# Patient Record
Sex: FEMALE | Race: BLACK OR AFRICAN AMERICAN | Employment: FULL TIME | URBAN - METROPOLITAN AREA
[De-identification: names, ages, dates, MRNs, and addresses within clinical notes are randomized per-mention and may not be internally consistent; named-entity substitution may affect disease eponyms.]

---

## 2017-02-01 ENCOUNTER — HOSPITAL ENCOUNTER (EMERGENCY)
Age: 27
Discharge: HOME | End: 2017-02-01
Attending: EMERGENCY MEDICINE
Payer: SELF-PAY

## 2017-02-01 ENCOUNTER — APPOINTMENT (OUTPATIENT)
Dept: CT IMAGING | Age: 27
End: 2017-02-01
Attending: EMERGENCY MEDICINE
Payer: SELF-PAY

## 2017-02-01 VITALS
SYSTOLIC BLOOD PRESSURE: 114 MMHG | WEIGHT: 145 LBS | OXYGEN SATURATION: 99 % | HEART RATE: 75 BPM | BODY MASS INDEX: 25.69 KG/M2 | HEIGHT: 63 IN | RESPIRATION RATE: 17 BRPM | TEMPERATURE: 98.2 F | DIASTOLIC BLOOD PRESSURE: 69 MMHG

## 2017-02-01 DIAGNOSIS — R10.31 ABDOMINAL PAIN, RIGHT LOWER QUADRANT: Primary | ICD-10-CM

## 2017-02-01 LAB
ALBUMIN SERPL BCP-MCNC: 3.8 G/DL (ref 3.5–5)
ALBUMIN/GLOB SERPL: 0.9 {RATIO} (ref 1.2–3.5)
ALP SERPL-CCNC: 59 U/L (ref 50–136)
ALT SERPL-CCNC: 21 U/L (ref 12–65)
ANION GAP BLD CALC-SCNC: 6 MMOL/L (ref 7–16)
AST SERPL W P-5'-P-CCNC: 17 U/L (ref 15–37)
BASOPHILS # BLD AUTO: 0 K/UL (ref 0–0.2)
BASOPHILS # BLD: 0 % (ref 0–2)
BILIRUB SERPL-MCNC: 0.5 MG/DL (ref 0.2–1.1)
BUN SERPL-MCNC: 15 MG/DL (ref 6–23)
CALCIUM SERPL-MCNC: 8.6 MG/DL (ref 8.3–10.4)
CHLORIDE SERPL-SCNC: 110 MMOL/L (ref 98–107)
CO2 SERPL-SCNC: 24 MMOL/L (ref 21–32)
CREAT SERPL-MCNC: 0.8 MG/DL (ref 0.6–1)
DIFFERENTIAL METHOD BLD: ABNORMAL
EOSINOPHIL # BLD: 0.1 K/UL (ref 0–0.8)
EOSINOPHIL NFR BLD: 2 % (ref 0.5–7.8)
ERYTHROCYTE [DISTWIDTH] IN BLOOD BY AUTOMATED COUNT: 14.2 % (ref 11.9–14.6)
GLOBULIN SER CALC-MCNC: 4.1 G/DL (ref 2.3–3.5)
GLUCOSE SERPL-MCNC: 91 MG/DL (ref 65–100)
HCG UR QL: NEGATIVE
HCT VFR BLD AUTO: 41.8 % (ref 35.8–46.3)
HGB BLD-MCNC: 13.6 G/DL (ref 11.7–15.4)
IMM GRANULOCYTES # BLD: 0 K/UL (ref 0–0.5)
IMM GRANULOCYTES NFR BLD AUTO: 0 % (ref 0–5)
LIPASE SERPL-CCNC: 129 U/L (ref 73–393)
LYMPHOCYTES # BLD AUTO: 34 % (ref 13–44)
LYMPHOCYTES # BLD: 2.5 K/UL (ref 0.5–4.6)
MCH RBC QN AUTO: 28.3 PG (ref 26.1–32.9)
MCHC RBC AUTO-ENTMCNC: 32.5 G/DL (ref 31.4–35)
MCV RBC AUTO: 87.1 FL (ref 79.6–97.8)
MONOCYTES # BLD: 0.6 K/UL (ref 0.1–1.3)
MONOCYTES NFR BLD AUTO: 8 % (ref 4–12)
NEUTS SEG # BLD: 4.2 K/UL (ref 1.7–8.2)
NEUTS SEG NFR BLD AUTO: 56 % (ref 43–78)
PLATELET # BLD AUTO: 320 K/UL (ref 150–450)
PMV BLD AUTO: 9.6 FL (ref 10.8–14.1)
POTASSIUM SERPL-SCNC: 4.1 MMOL/L (ref 3.5–5.1)
PROT SERPL-MCNC: 7.9 G/DL (ref 6.3–8.2)
RBC # BLD AUTO: 4.8 M/UL (ref 4.05–5.25)
SODIUM SERPL-SCNC: 140 MMOL/L (ref 136–145)
WBC # BLD AUTO: 7.5 K/UL (ref 4.3–11.1)

## 2017-02-01 PROCEDURE — 83690 ASSAY OF LIPASE: CPT | Performed by: EMERGENCY MEDICINE

## 2017-02-01 PROCEDURE — 99285 EMERGENCY DEPT VISIT HI MDM: CPT | Performed by: EMERGENCY MEDICINE

## 2017-02-01 PROCEDURE — 74011000258 HC RX REV CODE- 258: Performed by: EMERGENCY MEDICINE

## 2017-02-01 PROCEDURE — 94762 N-INVAS EAR/PLS OXIMTRY CONT: CPT | Performed by: EMERGENCY MEDICINE

## 2017-02-01 PROCEDURE — 74177 CT ABD & PELVIS W/CONTRAST: CPT

## 2017-02-01 PROCEDURE — 96374 THER/PROPH/DIAG INJ IV PUSH: CPT | Performed by: EMERGENCY MEDICINE

## 2017-02-01 PROCEDURE — 74011250636 HC RX REV CODE- 250/636: Performed by: EMERGENCY MEDICINE

## 2017-02-01 PROCEDURE — 81003 URINALYSIS AUTO W/O SCOPE: CPT | Performed by: EMERGENCY MEDICINE

## 2017-02-01 PROCEDURE — 80053 COMPREHEN METABOLIC PANEL: CPT | Performed by: EMERGENCY MEDICINE

## 2017-02-01 PROCEDURE — 85025 COMPLETE CBC W/AUTO DIFF WBC: CPT | Performed by: EMERGENCY MEDICINE

## 2017-02-01 PROCEDURE — 96375 TX/PRO/DX INJ NEW DRUG ADDON: CPT | Performed by: EMERGENCY MEDICINE

## 2017-02-01 PROCEDURE — 81025 URINE PREGNANCY TEST: CPT

## 2017-02-01 PROCEDURE — 74011636320 HC RX REV CODE- 636/320: Performed by: EMERGENCY MEDICINE

## 2017-02-01 RX ORDER — ONDANSETRON 2 MG/ML
4 INJECTION INTRAMUSCULAR; INTRAVENOUS
Status: COMPLETED | OUTPATIENT
Start: 2017-02-01 | End: 2017-02-01

## 2017-02-01 RX ORDER — HYDROMORPHONE HYDROCHLORIDE 1 MG/ML
0.5 INJECTION, SOLUTION INTRAMUSCULAR; INTRAVENOUS; SUBCUTANEOUS
Status: COMPLETED | OUTPATIENT
Start: 2017-02-01 | End: 2017-02-01

## 2017-02-01 RX ORDER — SODIUM CHLORIDE 0.9 % (FLUSH) 0.9 %
10 SYRINGE (ML) INJECTION
Status: COMPLETED | OUTPATIENT
Start: 2017-02-01 | End: 2017-02-01

## 2017-02-01 RX ADMIN — IOPAMIDOL 100 ML: 755 INJECTION, SOLUTION INTRAVENOUS at 08:28

## 2017-02-01 RX ADMIN — ONDANSETRON 4 MG: 2 INJECTION INTRAMUSCULAR; INTRAVENOUS at 08:03

## 2017-02-01 RX ADMIN — HYDROMORPHONE HYDROCHLORIDE 0.5 MG: 1 INJECTION, SOLUTION INTRAMUSCULAR; INTRAVENOUS; SUBCUTANEOUS at 08:03

## 2017-02-01 RX ADMIN — Medication 10 ML: at 08:28

## 2017-02-01 RX ADMIN — SODIUM CHLORIDE 100 ML: 900 INJECTION, SOLUTION INTRAVENOUS at 08:28

## 2017-02-01 NOTE — LETTER
[facility] EMERGENCY DEPT One [address] [id number] 
[phone] Work/School Note Date: 2/1/2017 To Whom It May concern: 
 
Neil Aguilera was seen and treated today in the emergency room by the following provider(s): 
Attending Provider: Trace Rodríguez MD.   
 
Neil Aguilera may return to work on 1/3/17.  
 
Sincerely, 
 
 
 
 
Christiane Wolfe R.N.

## 2017-02-01 NOTE — ED PROVIDER NOTES
HPI Comments: 31 yo female with abdominal pain. States pain gradually worsening for the past 2 days and states at work last night she developed nausea and vomiting and has been unable to keep food down since that time with pain in her abdomen. States most painful in the \"bottom\" of her abdomen. Denies any chest pain, no SOB, no vaginal bleeding or discharge, no further complaints. Patient is a 32 y.o. female presenting with abdominal pain. The history is provided by the patient. No  was used. Abdominal Pain    Associated symptoms include nausea and vomiting. Pertinent negatives include no fever, no diarrhea, no dysuria, no headaches, no chest pain and no back pain. Past Medical History:   Diagnosis Date    Asthma        No past surgical history on file. No family history on file. Social History     Social History    Marital status: SINGLE     Spouse name: N/A    Number of children: N/A    Years of education: N/A     Occupational History    Not on file. Social History Main Topics    Smoking status: Current Every Day Smoker    Smokeless tobacco: Not on file    Alcohol use Yes    Drug use: Yes     Special: Marijuana    Sexual activity: Not on file     Other Topics Concern    Not on file     Social History Narrative         ALLERGIES: Review of patient's allergies indicates no known allergies. Review of Systems   Constitutional: Negative for chills and fever. HENT: Negative for rhinorrhea and sore throat. Eyes: Negative for visual disturbance. Respiratory: Negative for cough and shortness of breath. Cardiovascular: Negative for chest pain and leg swelling. Gastrointestinal: Positive for abdominal pain, nausea and vomiting. Negative for diarrhea. Genitourinary: Negative for dysuria. Musculoskeletal: Negative for back pain and neck pain. Skin: Negative for rash. Neurological: Negative for weakness and headaches.    Psychiatric/Behavioral: The patient is not nervous/anxious. Vitals:    02/01/17 0711   BP: 126/63   Pulse: 98   Resp: 18   Temp: 98.3 °F (36.8 °C)   SpO2: 99%   Weight: 65.8 kg (145 lb)   Height: 5' 3\" (1.6 m)            Physical Exam   Constitutional: She is oriented to person, place, and time. She appears well-developed and well-nourished. HENT:   Head: Normocephalic. Right Ear: External ear normal.   Left Ear: External ear normal.   Eyes: Conjunctivae and EOM are normal. Pupils are equal, round, and reactive to light. Neck: Normal range of motion. Neck supple. No tracheal deviation present. Cardiovascular: Normal rate, regular rhythm, normal heart sounds and intact distal pulses. No murmur heard. Pulmonary/Chest: Effort normal and breath sounds normal. No respiratory distress. Abdominal: Soft. She exhibits no distension. There is tenderness (to palpation in the RLQ without rebound. Non-tender in LLQ, RUQ/LUQ). There is no rebound. Musculoskeletal: Normal range of motion. Neurological: She is alert and oriented to person, place, and time. No cranial nerve deficit. Skin: No rash noted. Nursing note and vitals reviewed.        MDM  Number of Diagnoses or Management Options  Abdominal pain, right lower quadrant: new and requires workup     Amount and/or Complexity of Data Reviewed  Clinical lab tests: ordered and reviewed  Tests in the radiology section of CPT®: ordered and reviewed  Tests in the medicine section of CPT®: ordered and reviewed  Review and summarize past medical records: yes    Risk of Complications, Morbidity, and/or Mortality  Presenting problems: high  Diagnostic procedures: high  Management options: high    Patient Progress  Patient progress: stable    ED Course       Procedures    Recent Results (from the past 12 hour(s))   CBC WITH AUTOMATED DIFF    Collection Time: 02/01/17  7:10 AM   Result Value Ref Range    WBC 7.5 4.3 - 11.1 K/uL    RBC 4.80 4.05 - 5.25 M/uL    HGB 13.6 11.7 - 15.4 g/dL HCT 41.8 35.8 - 46.3 %    MCV 87.1 79.6 - 97.8 FL    MCH 28.3 26.1 - 32.9 PG    MCHC 32.5 31.4 - 35.0 g/dL    RDW 14.2 11.9 - 14.6 %    PLATELET 548 586 - 554 K/uL    MPV 9.6 (L) 10.8 - 14.1 FL    DF AUTOMATED      NEUTROPHILS 56 43 - 78 %    LYMPHOCYTES 34 13 - 44 %    MONOCYTES 8 4.0 - 12.0 %    EOSINOPHILS 2 0.5 - 7.8 %    BASOPHILS 0 0.0 - 2.0 %    IMMATURE GRANULOCYTES 0.0 0.0 - 5.0 %    ABS. NEUTROPHILS 4.2 1.7 - 8.2 K/UL    ABS. LYMPHOCYTES 2.5 0.5 - 4.6 K/UL    ABS. MONOCYTES 0.6 0.1 - 1.3 K/UL    ABS. EOSINOPHILS 0.1 0.0 - 0.8 K/UL    ABS. BASOPHILS 0.0 0.0 - 0.2 K/UL    ABS. IMM. GRANS. 0.0 0.0 - 0.5 K/UL     Recent Results (from the past 12 hour(s))   CBC WITH AUTOMATED DIFF    Collection Time: 02/01/17  7:10 AM   Result Value Ref Range    WBC 7.5 4.3 - 11.1 K/uL    RBC 4.80 4.05 - 5.25 M/uL    HGB 13.6 11.7 - 15.4 g/dL    HCT 41.8 35.8 - 46.3 %    MCV 87.1 79.6 - 97.8 FL    MCH 28.3 26.1 - 32.9 PG    MCHC 32.5 31.4 - 35.0 g/dL    RDW 14.2 11.9 - 14.6 %    PLATELET 325 964 - 415 K/uL    MPV 9.6 (L) 10.8 - 14.1 FL    DF AUTOMATED      NEUTROPHILS 56 43 - 78 %    LYMPHOCYTES 34 13 - 44 %    MONOCYTES 8 4.0 - 12.0 %    EOSINOPHILS 2 0.5 - 7.8 %    BASOPHILS 0 0.0 - 2.0 %    IMMATURE GRANULOCYTES 0.0 0.0 - 5.0 %    ABS. NEUTROPHILS 4.2 1.7 - 8.2 K/UL    ABS. LYMPHOCYTES 2.5 0.5 - 4.6 K/UL    ABS. MONOCYTES 0.6 0.1 - 1.3 K/UL    ABS. EOSINOPHILS 0.1 0.0 - 0.8 K/UL    ABS. BASOPHILS 0.0 0.0 - 0.2 K/UL    ABS. IMM.  GRANS. 0.0 0.0 - 0.5 K/UL   METABOLIC PANEL, COMPREHENSIVE    Collection Time: 02/01/17  7:10 AM   Result Value Ref Range    Sodium 140 136 - 145 mmol/L    Potassium 4.1 3.5 - 5.1 mmol/L    Chloride 110 (H) 98 - 107 mmol/L    CO2 24 21 - 32 mmol/L    Anion gap 6 (L) 7 - 16 mmol/L    Glucose 91 65 - 100 mg/dL    BUN 15 6 - 23 MG/DL    Creatinine 0.80 0.6 - 1.0 MG/DL    GFR est AA >60 >60 ml/min/1.73m2    GFR est non-AA >60 >60 ml/min/1.73m2    Calcium 8.6 8.3 - 10.4 MG/DL    Bilirubin, total 0.5 0.2 - 1.1 MG/DL    ALT 21 12 - 65 U/L    AST 17 15 - 37 U/L    Alk. phosphatase 59 50 - 136 U/L    Protein, total 7.9 6.3 - 8.2 g/dL    Albumin 3.8 3.5 - 5.0 g/dL    Globulin 4.1 (H) 2.3 - 3.5 g/dL    A-G Ratio 0.9 (L) 1.2 - 3.5     HCG URINE, QL. - POC    Collection Time: 02/01/17  7:36 AM   Result Value Ref Range    Pregnancy test,urine (POC) NEGATIVE  NEG       Ct Abd Pelv W Cont    Result Date: 2/1/2017  CT of the Abdomen with Contrast and CT of the Pelvis with Contrast 2/1/2017 Indication: Sharp lower abdomen pain with vomiting onset this morning. Comparison: none Technique:  Multiple contiguous axial images encompassing the abdomen and pelvis are obtained following the administration of approximately 100cc of Isovue 370 nonionic intravenous contrast by power injector. No oral contrast is administered. For this CT scanner at least one of the following techniques is utilized to decrease patient radiation dose: Automatic exposure control, KVP and mA modulation based on patient weight, and iterative reconstruction. IV contrast is given to better delineate vascular structures and increase sensitivity of lesions in the solid organs. CT abdomen-lung bases are unremarkable. In the abdomen there is no free air or free fluid. The unopacified bowel loops are nondistended, no obvious inflammation seen. The liver, gallbladder, biliary tree and pancreas are not remarkable with only an incidental small site of focal fatty change in the left lobe of the liver at a typical position adjacent to the falciform ligament. The stomach, duodenum, spleen, adrenals and kidneys are unremarkable. Aorta and IVC are normal and there is no enlarged adenopathy. CT pelvis-the opacified pelvic small bowel loops and the rectosigmoid colon are nondistended and show nothing acute. A normal appendix is identified. The bladder is relatively nondistended. The uterus and left adnexa are not remarkable.  There is an mildly enlarged right adnexal cyst measuring up to 3.7 cm, otherwise simple appearing though. There is no significant free fluid. There is no lymphadenopathy or acute vascular lesion. IMPRESSION: No acute abnormality seen in the abdomen or pelvis. Note made only of a slightly prominent size right adnexal cyst which otherwise appears simple on this exam. For this premenopausal female this is likely a functional cyst.       31 yo female with abdominal pain:    On repeat assessment patient very well appearing, states no pain at this time, resting comfortably. I discussed her results including the cyst on her right ovary which needs follow up with gynecology. I discussed torsion with patient and feel given her symptoms of gradual pain for days with nausea and vomiting today that this is unlikely however encouraged immediate return if her pain returns and patient in full agreement with plan and follow up.

## 2017-02-01 NOTE — DISCHARGE INSTRUCTIONS
AS WE DISCUSSED, IF YOUR ABDOMINAL PAIN RETURNS, OR IF YOU DEVELOP ANY FEVERS OR CHILLS, ANY NAUSEA OR VOMITING AND CANNOT TOLERATE ORAL INTAKE, ANY CHEST PAIN OR SHORTNESS OF BREATH, ANY VAGINAL BLEEDING OR DISCHARGE OR ANY FURTHER CONCERNS THEN PLEASE RETURN IMMEDIATELY TO THE EMERGENCY DEPARTMENT. OTHERWISE PLEASE FOLLOW UP WITH YOUR PRIMARY CARE PROVIDER IN 1-2 DAYS. Also, please follow up with gynecology as above or return immediately if pain returns as we discussed. Abdominal Pain: Care Instructions  Your Care Instructions    Abdominal pain has many possible causes. Some aren't serious and get better on their own in a few days. Others need more testing and treatment. If your pain continues or gets worse, you need to be rechecked and may need more tests to find out what is wrong. You may need surgery to correct the problem. Don't ignore new symptoms, such as fever, nausea and vomiting, urination problems, pain that gets worse, and dizziness. These may be signs of a more serious problem. Your doctor may have recommended a follow-up visit in the next 8 to 12 hours. If you are not getting better, you may need more tests or treatment. The doctor has checked you carefully, but problems can develop later. If you notice any problems or new symptoms, get medical treatment right away. Follow-up care is a key part of your treatment and safety. Be sure to make and go to all appointments, and call your doctor if you are having problems. It's also a good idea to know your test results and keep a list of the medicines you take. How can you care for yourself at home? · Rest until you feel better. · To prevent dehydration, drink plenty of fluids, enough so that your urine is light yellow or clear like water. Choose water and other caffeine-free clear liquids until you feel better.  If you have kidney, heart, or liver disease and have to limit fluids, talk with your doctor before you increase the amount of fluids you drink. · If your stomach is upset, eat mild foods, such as rice, dry toast or crackers, bananas, and applesauce. Try eating several small meals instead of two or three large ones. · Wait until 48 hours after all symptoms have gone away before you have spicy foods, alcohol, and drinks that contain caffeine. · Do not eat foods that are high in fat. · Avoid anti-inflammatory medicines such as aspirin, ibuprofen (Advil, Motrin), and naproxen (Aleve). These can cause stomach upset. Talk to your doctor if you take daily aspirin for another health problem. When should you call for help? Call 911 anytime you think you may need emergency care. For example, call if:  · You passed out (lost consciousness). · You pass maroon or very bloody stools. · You vomit blood or what looks like coffee grounds. · You have new, severe belly pain. Call your doctor now or seek immediate medical care if:  · Your pain gets worse, especially if it becomes focused in one area of your belly. · You have a new or higher fever. · Your stools are black and look like tar, or they have streaks of blood. · You have unexpected vaginal bleeding. · You have symptoms of a urinary tract infection. These may include:  ¨ Pain when you urinate. ¨ Urinating more often than usual.  ¨ Blood in your urine. · You are dizzy or lightheaded, or you feel like you may faint. Watch closely for changes in your health, and be sure to contact your doctor if:  · You are not getting better after 1 day (24 hours). Where can you learn more? Go to http://chelsey-hali.info/. Enter J596 in the search box to learn more about \"Abdominal Pain: Care Instructions. \"  Current as of: May 27, 2016  Content Version: 11.1  © 8415-3842 [employer]. Care instructions adapted under license by [employer] (which disclaims liability or warranty for this information).  If you have questions about a medical condition or this instruction, always ask your healthcare professional. [place] [address] any warranty or liability for your use of this information. Nausea and Vomiting: Care Instructions  Your Care Instructions    When you are nauseated, you may feel weak and sweaty and notice a lot of saliva in your mouth. Nausea often leads to vomiting. Most of the time you do not need to worry about nausea and vomiting, but they can be signs of other illnesses. Two common causes of nausea and vomiting are stomach flu and food poisoning. Nausea and vomiting from viral stomach flu will usually start to improve within 24 hours. Nausea and vomiting from food poisoning may last from 12 to 48 hours. The doctor has checked you carefully, but problems can develop later. If you notice any problems or new symptoms, get medical treatment right away. Follow-up care is a key part of your treatment and safety. Be sure to make and go to all appointments, and call your doctor if you are having problems. It's also a good idea to know your test results and keep a list of the medicines you take. How can you care for yourself at home? · To prevent dehydration, drink plenty of fluids, enough so that your urine is light yellow or clear like water. Choose water and other caffeine-free clear liquids until you feel better. If you have kidney, heart, or liver disease and have to limit fluids, talk with your doctor before you increase the amount of fluids you drink. · Rest in bed until you feel better. · When you are able to eat, try clear soups, mild foods, and liquids until all symptoms are gone for 12 to 48 hours. Other good choices include dry toast, crackers, cooked cereal, and gelatin dessert, such as Jell-O. When should you call for help? Call 911 anytime you think you may need emergency care. For example, call if:  · You passed out (lost consciousness).   Call your doctor now or seek immediate medical care if:  · You have symptoms of dehydration, such as:  ¨ Dry eyes and a dry mouth. ¨ Passing only a little dark urine. ¨ Feeling thirstier than usual.  · You have new or worsening belly pain. · You have a new or higher fever. · You vomit blood or what looks like coffee grounds. Watch closely for changes in your health, and be sure to contact your doctor if:  · You have ongoing nausea and vomiting. · Your vomiting is getting worse. · Your vomiting lasts longer than 2 days. · You are not getting better as expected. Where can you learn more? Go to http://chelsey-hali.info/. Enter 25 [id number] in the search box to learn more about \"Nausea and Vomiting: Care Instructions. \"  Current as of: May 27, 2016  Content Version: 11.1  © 8785-8883 [employer]. Care instructions adapted under license by [employer] (which disclaims liability or warranty for this information). If you have questions about a medical condition or this instruction, always ask your healthcare professional. [address] any warranty or liability for your use of this information.

## 2017-02-01 NOTE — ED TRIAGE NOTES
Per patient 0130 started vomiting states she had to leave work. Per patient sharp pains in the lower abd.  Denies any urinary sx

## 2018-03-10 ENCOUNTER — APPOINTMENT (OUTPATIENT)
Dept: CT IMAGING | Age: 28
End: 2018-03-10
Attending: NURSE PRACTITIONER
Payer: SELF-PAY

## 2018-03-10 ENCOUNTER — HOSPITAL ENCOUNTER (EMERGENCY)
Age: 28
Discharge: HOME | End: 2018-03-10
Attending: EMERGENCY MEDICINE
Payer: SELF-PAY

## 2018-03-10 ENCOUNTER — APPOINTMENT (OUTPATIENT)
Dept: GENERAL RADIOLOGY | Age: 28
End: 2018-03-10
Attending: NURSE PRACTITIONER
Payer: SELF-PAY

## 2018-03-10 VITALS
HEIGHT: 63 IN | TEMPERATURE: 98.3 F | SYSTOLIC BLOOD PRESSURE: 115 MMHG | RESPIRATION RATE: 15 BRPM | BODY MASS INDEX: 31.89 KG/M2 | DIASTOLIC BLOOD PRESSURE: 68 MMHG | OXYGEN SATURATION: 98 % | HEART RATE: 89 BPM | WEIGHT: 180 LBS

## 2018-03-10 DIAGNOSIS — S20.20XA MULTIPLE CONTUSIONS OF TRUNK, INITIAL ENCOUNTER: ICD-10-CM
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
## 2018-03-10 DIAGNOSIS — S09.90XA CLOSED HEAD INJURY, INITIAL ENCOUNTER: ICD-10-CM

## 2018-03-10 LAB — HCG UR QL: NEGATIVE

## 2018-03-10 PROCEDURE — 81025 URINE PREGNANCY TEST: CPT

## 2018-03-10 PROCEDURE — 72040 X-RAY EXAM NECK SPINE 2-3 VW: CPT

## 2018-03-10 PROCEDURE — 70450 CT HEAD/BRAIN W/O DYE: CPT

## 2018-03-10 PROCEDURE — 99284 EMERGENCY DEPT VISIT MOD MDM: CPT | Performed by: EMERGENCY MEDICINE

## 2018-03-10 PROCEDURE — 73130 X-RAY EXAM OF HAND: CPT

## 2018-03-10 PROCEDURE — 72070 X-RAY EXAM THORAC SPINE 2VWS: CPT

## 2018-03-10 PROCEDURE — 71046 X-RAY EXAM CHEST 2 VIEWS: CPT

## 2018-03-10 RX ORDER — BUTALBITAL, ACETAMINOPHEN AND CAFFEINE 300; 40; 50 MG/1; MG/1; MG/1
1-2 CAPSULE ORAL
Qty: 20 CAP | Refills: 0 | Status: SHIPPED | OUTPATIENT
Start: 2018-03-10

## 2018-03-10 RX ORDER — NAPROXEN SODIUM 550 MG/1
550 TABLET ORAL 2 TIMES DAILY WITH MEALS
Qty: 20 TAB | Refills: 0 | Status: SHIPPED | OUTPATIENT
Start: 2018-03-10
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
## 2018-03-10 NOTE — LETTER
[facility] EMERGENCY DEPT One [address] [id number] [phone] Work/School Note Date: 3/10/2018 To Whom It May concern: 
 
Huey Tay was seen and treated today in the emergency room by the following provider(s): 
Attending Provider: Jone Madera MD.   
 
Huey Tay may return to work on 3-12-18, please excuse pt from 3-9 --- 3-12. Per MD Lenox Peabody Sincerely, Warren Gardner RN

## 2018-03-11 NOTE — DISCHARGE INSTRUCTIONS
Keep right hand elevated as much as possible  Return if swelling worsens, finger turns pale, or numb    meds as directed as needed    Follow up with dr David Licona  Return to the e.r. If worse in any way           Learning About a Closed Head Injury  What is a closed head injury? A closed head injury happens when your head gets hit hard. The strong force of the blow causes your brain to shake in your skull. This movement can cause the brain to bruise, swell, or tear. Sometimes nerves or blood vessels also get damaged. This can cause bleeding in or around the brain. A concussion is a type of closed head injury. What are the symptoms? If you have a mild concussion, you may have a mild headache or feel \"not quite right. \" These symptoms are common. They usually go away over a few days to 4 weeks. But sometimes after a concussion, you feel like you can't function as well as before the injury. And you have new symptoms. This is called postconcussive syndrome. You may:  · Find it harder to solve problems, think, concentrate, or remember. · Have headaches. · Have changes in your sleep patterns, such as not being able to sleep or sleeping all the time. · Have changes in your personality. · Not be interested in your usual activities. · Feel angry or anxious without a clear reason. · Lose your sense of taste or smell. · Be dizzy, lightheaded, or unsteady. It may be hard to stand or walk. How is a closed head injury treated? Any person who may have a concussion needs to see a doctor. Some people have to stay in the hospital to be watched. Others can go home safely. If you go home, follow your doctor's instructions. He or she will tell you if you need someone to watch you closely for the next 24 hours or longer. Rest is the best treatment. Get plenty of sleep at night. And try to rest during the day. · Avoid activities that are physically or mentally demanding. These include housework, exercise, and schoolwork. And don't play video games, send text messages, or use the computer. You may need to change your school or work schedule to be able to avoid these activities. · Ask your doctor when it's okay to drive, ride a bike, or operate machinery. · Take an over-the-counter pain medicine, such as acetaminophen (Tylenol), ibuprofen (Advil, Motrin), or naproxen (Aleve). Be safe with medicines. Read and follow all instructions on the label. · Check with your doctor before you use any other medicines for pain. · Do not drink alcohol or use illegal drugs. They can slow recovery. They can also increase your risk of getting a second head injury. Follow-up care is a key part of your treatment and safety. Be sure to make and go to all appointments, and call your doctor if you are having problems. It's also a good idea to know your test results and keep a list of the medicines you take. Where can you learn more? Go to http://chelsey-hali.info/. Enter E235 in the search box to learn more about \"Learning About a Closed Head Injury. \"  Current as of: October 14, 2016  Content Version: 11.4  © 9892-4527 [employer]. Care instructions adapted under license by [employer] (which disclaims liability or warranty for this information). If you have questions about a medical condition or this instruction, always ask your healthcare professional. [place] [address] any warranty or liability for your use of this information. Contusion: Care Instructions  Your Care Instructions    Contusion is the medical term for a bruise. It is the result of a direct blow or an impact, such as a fall. Contusions are common sports injuries. Most people think of a bruise as a black-and-blue spot. This happens when small blood vessels get torn and leak blood under the skin. But bones, muscles, and organs can also get bruised. This may damage deep tissues but not cause a bruise you can see.   The doctor will do a physical exam to find the location of your contusion. You may also have tests to make sure you do not have a more serious injury, such as a broken bone or nerve damage. These may include X-rays or other imaging tests like a CT scan or MRI. Deep-tissue contusions may cause pain and swelling. But if there is no serious damage, they will often get better in a few weeks with home treatment. The doctor has checked you carefully, but problems can develop later. If you notice any problems or new symptoms, get medical treatment right away. Follow-up care is a key part of your treatment and safety. Be sure to make and go to all appointments, and call your doctor if you are having problems. It's also a good idea to know your test results and keep a list of the medicines you take. How can you care for yourself at home? · Put ice or a cold pack on the sore area for 10 to 20 minutes at a time to stop swelling. Put a thin cloth between the ice pack and your skin. · Be safe with medicines. Read and follow all instructions on the label. ¨ If the doctor gave you a prescription medicine for pain, take it as prescribed. ¨ If you are not taking a prescription pain medicine, ask your doctor if you can take an over-the-counter medicine. · If you can, prop up the sore area on pillows as much as possible for the next few days. Try to keep the sore area above the level of your heart. When should you call for help? Call your doctor now or seek immediate medical care if:  ? · Your pain gets worse. ? · You have new or worse swelling. ? · You have tingling, weakness, or numbness in the area near the contusion. ? · The area near the contusion is cold or pale. ? Watch closely for changes in your health, and be sure to contact your doctor if:  ? · You do not get better as expected. Where can you learn more? Go to http://chelsey-hali.info/.   Enter J098 in the search box to learn more about \"Contusion: Care Instructions. \"  Current as of: March 20, 2017  Content Version: 11.4  © 8537-0364 Healthwise, [employer]. Care instructions adapted under license by [employer] (which disclaims liability or warranty for this information). If you have questions about a medical condition or this instruction, always ask your healthcare professional. [place] [address] any warranty or liability for your use of this information.

## 2018-03-11 NOTE — ED NOTES
I have reviewed discharge instructions with the patient. The patient verbalized understanding. Patient left ED via Discharge Method: ambulatory to Home with (insert name of family/friend, self, transport self). Opportunity for questions and clarification provided. Patient given 2 scripts. To continue your aftercare when you leave the hospital, you may receive an automated call from our care team to check in on how you are doing. This is a free service and part of our promise to provide the best care and service to meet your aftercare needs.  If you have questions, or wish to unsubscribe from this service please call [phone]. Thank you for Choosing our [facility] Form Emergency Department.

## 2019-12-31 ENCOUNTER — HOSPITAL ENCOUNTER (EMERGENCY)
Age: 29
Discharge: HOME | End: 2019-12-31
Attending: EMERGENCY MEDICINE
Payer: SELF-PAY

## 2019-12-31 VITALS
WEIGHT: 180 LBS | DIASTOLIC BLOOD PRESSURE: 78 MMHG | SYSTOLIC BLOOD PRESSURE: 122 MMHG | BODY MASS INDEX: 31.89 KG/M2 | TEMPERATURE: 98 F | HEIGHT: 63 IN | OXYGEN SATURATION: 99 % | HEART RATE: 98 BPM | RESPIRATION RATE: 16 BRPM

## 2019-12-31 DIAGNOSIS — R11.2 NAUSEA AND VOMITING, INTRACTABILITY OF VOMITING NOT SPECIFIED, UNSPECIFIED VOMITING TYPE: Primary | ICD-10-CM

## 2019-12-31 LAB
ALBUMIN SERPL-MCNC: 3.7 G/DL (ref 3.5–5)
ALBUMIN/GLOB SERPL: 1 {RATIO} (ref 1.2–3.5)
ALP SERPL-CCNC: 62 U/L (ref 50–136)
ALT SERPL-CCNC: 25 U/L (ref 12–65)
ANION GAP SERPL CALC-SCNC: 5 MMOL/L (ref 7–16)
AST SERPL-CCNC: 10 U/L (ref 15–37)
BASOPHILS # BLD: 0.1 K/UL (ref 0–0.2)
BASOPHILS NFR BLD: 1 % (ref 0–2)
BILIRUB SERPL-MCNC: 0.3 MG/DL (ref 0.2–1.1)
BUN SERPL-MCNC: 13 MG/DL (ref 6–23)
CALCIUM SERPL-MCNC: 9.4 MG/DL (ref 8.3–10.4)
CHLORIDE SERPL-SCNC: 108 MMOL/L (ref 98–107)
CO2 SERPL-SCNC: 27 MMOL/L (ref 21–32)
CREAT SERPL-MCNC: 0.68 MG/DL (ref 0.6–1)
DIFFERENTIAL METHOD BLD: ABNORMAL
EOSINOPHIL # BLD: 0.2 K/UL (ref 0–0.8)
EOSINOPHIL NFR BLD: 2 % (ref 0.5–7.8)
ERYTHROCYTE [DISTWIDTH] IN BLOOD BY AUTOMATED COUNT: 14 % (ref 11.9–14.6)
GLOBULIN SER CALC-MCNC: 3.8 G/DL (ref 2.3–3.5)
GLUCOSE SERPL-MCNC: 101 MG/DL (ref 65–100)
HCT VFR BLD AUTO: 44 % (ref 35.8–46.3)
HGB BLD-MCNC: 14.3 G/DL (ref 11.7–15.4)
IMM GRANULOCYTES # BLD AUTO: 0.1 K/UL (ref 0–0.5)
IMM GRANULOCYTES NFR BLD AUTO: 0 % (ref 0–5)
LYMPHOCYTES # BLD: 2.7 K/UL (ref 0.5–4.6)
LYMPHOCYTES NFR BLD: 24 % (ref 13–44)
MCH RBC QN AUTO: 30.8 PG (ref 26.1–32.9)
MCHC RBC AUTO-ENTMCNC: 32.5 G/DL (ref 31.4–35)
MCV RBC AUTO: 94.6 FL (ref 79.6–97.8)
MONOCYTES # BLD: 0.9 K/UL (ref 0.1–1.3)
MONOCYTES NFR BLD: 8 % (ref 4–12)
NEUTS SEG # BLD: 7.3 K/UL (ref 1.7–8.2)
NEUTS SEG NFR BLD: 65 % (ref 43–78)
NRBC # BLD: 0 K/UL (ref 0–0.2)
PLATELET # BLD AUTO: 346 K/UL (ref 150–450)
PMV BLD AUTO: 9.5 FL (ref 9.4–12.3)
POTASSIUM SERPL-SCNC: 3.6 MMOL/L (ref 3.5–5.1)
PROT SERPL-MCNC: 7.5 G/DL (ref 6.3–8.2)
RBC # BLD AUTO: 4.65 M/UL (ref 4.05–5.2)
SODIUM SERPL-SCNC: 140 MMOL/L (ref 136–145)
WBC # BLD AUTO: 11.2 K/UL (ref 4.3–11.1)

## 2019-12-31 PROCEDURE — 85025 COMPLETE CBC W/AUTO DIFF WBC: CPT

## 2019-12-31 PROCEDURE — 74011250637 HC RX REV CODE- 250/637: Performed by: EMERGENCY MEDICINE

## 2019-12-31 PROCEDURE — 74011250636 HC RX REV CODE- 250/636: Performed by: EMERGENCY MEDICINE

## 2019-12-31 PROCEDURE — 99283 EMERGENCY DEPT VISIT LOW MDM: CPT | Performed by: EMERGENCY MEDICINE

## 2019-12-31 PROCEDURE — 80053 COMPREHEN METABOLIC PANEL: CPT

## 2019-12-31 PROCEDURE — 81003 URINALYSIS AUTO W/O SCOPE: CPT | Performed by: EMERGENCY MEDICINE

## 2019-12-31 PROCEDURE — 96360 HYDRATION IV INFUSION INIT: CPT | Performed by: EMERGENCY MEDICINE

## 2019-12-31 RX ORDER — ONDANSETRON 4 MG/1
4 TABLET, ORALLY DISINTEGRATING ORAL
Qty: 11 TAB | Refills: 1 | Status: SHIPPED | OUTPATIENT
Start: 2019-12-31

## 2019-12-31 RX ORDER — SODIUM CHLORIDE 9 MG/ML
1000 INJECTION, SOLUTION INTRAVENOUS ONCE
Status: COMPLETED | OUTPATIENT
Start: 2019-12-31 | End: 2019-12-31

## 2019-12-31 RX ORDER — ONDANSETRON 4 MG/1
4 TABLET, ORALLY DISINTEGRATING ORAL
Status: COMPLETED | OUTPATIENT
Start: 2019-12-31 | End: 2019-12-31

## 2019-12-31 RX ADMIN — SODIUM CHLORIDE 1000 ML: 900 INJECTION, SOLUTION INTRAVENOUS at 07:05

## 2019-12-31 RX ADMIN — ONDANSETRON 4 MG: 4 TABLET, ORALLY DISINTEGRATING ORAL at 07:05

## 2019-12-31 NOTE — ED NOTES
I have reviewed discharge instructions with the patient. The patient verbalized understanding. Patient left ED via Discharge Method: ambulatory to Home with self. Opportunity for questions and clarification provided. Patient given 1 scripts. To continue your aftercare when you leave the hospital, you may receive an automated call from our care team to check in on how you are doing. This is a free service and part of our promise to provide the best care and service to meet your aftercare needs.  If you have questions, or wish to unsubscribe from this service please call [phone]. Thank you for Choosing our [facility] Emergency Department.

## 2019-12-31 NOTE — DISCHARGE INSTRUCTIONS
Small frequent meals  Avoid fatty greasy or spicy foods  Drink plenty liquids  Use Zofran as needed for nausea

## 2019-12-31 NOTE — ED PROVIDER NOTES
[facility] Emergency Department  Arrival Date/Time: 12/31/2019 @  6:28 AM      Phil Gutiérrez  MRN: [id number]      34 y.o. female    YOB: 1990   [phone] (home)     [facility] EMERGENCY DEPT Merced Boyer  Seen on 12/31/2019 @ 6:54 AM      Today's Chief Complaint:   Chief Complaint   Patient presents with    Vomiting     HPI: 31-year-old female works as a  at [employer] presents to the emergency department with 1 day of nausea and vomiting. Able to keep fluids down. Unable to tolerate solids    No fever. No chills. No sick contacts at home or work. No alleviating. No aggravating. HPI    Review of Systems: Review of Systems   Constitutional: Negative for chills and fever. Gastrointestinal: Positive for nausea and vomiting. Genitourinary: Negative. Musculoskeletal: Negative. Psychiatric/Behavioral: Negative. Past Medical History: Primary Care Doctor: None  Meds, PMH, PSHx, SocHx at end of this note     Allergies: No Known Allergies      Key Anti-Platelet Anticoagulant Meds     The patient is on no antiplatelet meds or anticoagulants. Physical Exam:  Nursing documentation reviewed. Patient Vitals for the past 24 hrs:   Temp Pulse Resp BP SpO2   12/31/19 0450 98 °F (36.7 °C) 98 16 122/78 99 %     Oxygen Therapy  O2 Sat (%): 99 % (12/31/19 0450)  O2 Device: Room air (12/31/19 0450) Vital signs were reviewed. Physical Exam  Vitals signs and nursing note reviewed. Constitutional:       Appearance: Normal appearance. HENT:      Mouth/Throat:      Mouth: Mucous membranes are moist.   Cardiovascular:      Rate and Rhythm: Normal rate and regular rhythm. Pulses: Normal pulses. Pulmonary:      Effort: No respiratory distress. Abdominal:      General: There is no distension. Tenderness: There is no tenderness. There is no guarding. Skin:     General: Skin is warm.       Capillary Refill: Capillary refill takes less than 2 seconds. Neurological:      General: No focal deficit present. Mental Status: She is alert and oriented to person, place, and time. Psychiatric:         Mood and Affect: Mood normal.         MEDICAL DECISION MAKING:   Differential Diagnosis:    MDM  Number of Diagnoses or Management Options  Nausea and vomiting, intractability of vomiting not specified, unspecified vomiting type:   Diagnosis management comments: 68-year-old female with nausea and vomiting. No fever no chills. Check labs. Hydrate the patient. Amount and/or Complexity of Data Reviewed  Clinical lab tests: ordered and reviewed    Risk of Complications, Morbidity, and/or Mortality  Presenting problems: moderate  Diagnostic procedures: minimal  Management options: moderate          Data/Management:    Lab findings during this visit:   Recent Results (from the past 48 hour(s))   CBC WITH AUTOMATED DIFF    Collection Time: 12/31/19  4:56 AM   Result Value Ref Range    WBC 11.2 (H) 4.3 - 11.1 K/uL    RBC 4.65 4.05 - 5.2 M/uL    HGB 14.3 11.7 - 15.4 g/dL    HCT 44.0 35.8 - 46.3 %    MCV 94.6 79.6 - 97.8 FL    MCH 30.8 26.1 - 32.9 PG    MCHC 32.5 31.4 - 35.0 g/dL    RDW 14.0 11.9 - 14.6 %    PLATELET 634 193 - 751 K/uL    MPV 9.5 9.4 - 12.3 FL    ABSOLUTE NRBC 0.00 0.0 - 0.2 K/uL    DF AUTOMATED      NEUTROPHILS 65 43 - 78 %    LYMPHOCYTES 24 13 - 44 %    MONOCYTES 8 4.0 - 12.0 %    EOSINOPHILS 2 0.5 - 7.8 %    BASOPHILS 1 0.0 - 2.0 %    IMMATURE GRANULOCYTES 0 0.0 - 5.0 %    ABS. NEUTROPHILS 7.3 1.7 - 8.2 K/UL    ABS. LYMPHOCYTES 2.7 0.5 - 4.6 K/UL    ABS. MONOCYTES 0.9 0.1 - 1.3 K/UL    ABS. EOSINOPHILS 0.2 0.0 - 0.8 K/UL    ABS. BASOPHILS 0.1 0.0 - 0.2 K/UL    ABS. IMM.  GRANS. 0.1 0.0 - 0.5 K/UL   METABOLIC PANEL, COMPREHENSIVE    Collection Time: 12/31/19  4:56 AM   Result Value Ref Range    Sodium 140 136 - 145 mmol/L    Potassium 3.6 3.5 - 5.1 mmol/L    Chloride 108 (H) 98 - 107 mmol/L    CO2 27 21 - 32 mmol/L    Anion gap 5 (L) 7 - 16 mmol/L    Glucose 101 (H) 65 - 100 mg/dL    BUN 13 6 - 23 MG/DL    Creatinine 0.68 0.6 - 1.0 MG/DL    GFR est AA >60 >60 ml/min/1.73m2    GFR est non-AA >60 >60 ml/min/1.73m2    Calcium 9.4 8.3 - 10.4 MG/DL    Bilirubin, total 0.3 0.2 - 1.1 MG/DL    ALT (SGPT) 25 12 - 65 U/L    AST (SGOT) 10 (L) 15 - 37 U/L    Alk. phosphatase 62 50 - 136 U/L    Protein, total 7.5 6.3 - 8.2 g/dL    Albumin 3.7 3.5 - 5.0 g/dL    Globulin 3.8 (H) 2.3 - 3.5 g/dL    A-G Ratio 1.0 (L) 1.2 - 3.5         Radiology studies during this visit: No results found. Medications given in the ED: Medications - No data to display    Recheck and Additional Documentation:  (use .addrecheck  . addsepsis   . addstroke   . addhip  . addhandoff  . addcctime)     Patient better after IV fluids. Labs reviewed and are unremarkable. We will discharge her home with a short course of antiemetics     Procedure Documentation:   Procedures     Other ED Course Notes:        Assessment and Plan:    Impression:     ICD-10-CM ICD-9-CM   1. Nausea and vomiting, intractability of vomiting not specified, unspecified vomiting type R11.2 787.01     Disposition:  Home   follow-up:   Follow-up Information     Follow up With Specialties Details Why Contact [facility] EMERGENCY DEPT Emergency Medicine   [phone] Med:   Discharge Medication List as of 12/31/2019  7:58 AM      START taking these medications    Details   ondansetron (ZOFRAN ODT) 4 mg disintegrating tablet Take 1 Tab by mouth every eight (8) hours as needed for Nausea. , Print, Disp-11 Tab, R-1         CONTINUE these medications which have NOT CHANGED    Details   naproxen sodium (ANAPROX DS) 550 mg tablet Take 1 Tab by mouth two (2) times daily (with meals). , Print, Disp-20 Tab, R-0      butalbital-acetaminophen-caff (FIORICET) -40 mg per capsule Take 1-2 Caps by mouth every six (6) hours as needed for Pain., Print, Disp-20 Cap, R-0             Past Medical History:      Past Medical History:   Diagnosis Date    Asthma      No past surgical history on file. Social History     Tobacco Use    Smoking status: Current Every Day Smoker   Substance Use Topics    Alcohol use: Yes    Drug use: Yes     Types: Marijuana     Prior to Admission Medications   Prescriptions Last Dose Informant Patient Reported? Taking? butalbital-acetaminophen-caff (FIORICET) -40 mg per capsule   No No   Sig: Take 1-2 Caps by mouth every six (6) hours as needed for Pain. naproxen sodium (ANAPROX DS) 550 mg tablet   No No   Sig: Take 1 Tab by mouth two (2) times daily (with meals).       Facility-Administered Medications: None

## 2019-12-31 NOTE — LETTER
[facility] EMERGENCY DEPT 
ONE  [address] [facility] [address] [facility] 
[phone] Work/School Note Date: 12/31/2019 To Whom It May concern: 
 
Earle Bamberger was seen and treated today in the emergency room by the following provider(s): 
Attending Provider: Sima Mcclure MD.   
 
Earle Bamberger may return to work on 1/1/2020., Sincerely, 
 
 
 
 
Pily Ruff MD

## 2019-12-31 NOTE — ED TRIAGE NOTES
Patient states nausea, vomiting. States this has been on going all day. States that she was sent home from work today because of this. States attempted OTC nausea medication without relief. Patient also states lower abdominal pain.

## 2020-03-17 ENCOUNTER — HOSPITAL ENCOUNTER (EMERGENCY)
Age: 30
Discharge: HOME | End: 2020-03-17
Attending: EMERGENCY MEDICINE
Payer: SELF-PAY

## 2020-03-17 VITALS
DIASTOLIC BLOOD PRESSURE: 79 MMHG | RESPIRATION RATE: 18 BRPM | HEART RATE: 98 BPM | SYSTOLIC BLOOD PRESSURE: 129 MMHG | BODY MASS INDEX: 26.58 KG/M2 | TEMPERATURE: 98.9 F | OXYGEN SATURATION: 97 % | WEIGHT: 150 LBS | HEIGHT: 63 IN

## 2020-03-17 DIAGNOSIS — J06.9 VIRAL UPPER RESPIRATORY ILLNESS: Primary | ICD-10-CM

## 2020-03-17 PROCEDURE — 99282 EMERGENCY DEPT VISIT SF MDM: CPT

## 2020-03-17 RX ORDER — FLUTICASONE PROPIONATE AND SALMETEROL 100; 50 UG/1; UG/1
1 POWDER RESPIRATORY (INHALATION) EVERY 12 HOURS
Qty: 1 INHALER | Refills: 0 | Status: SHIPPED | OUTPATIENT
Start: 2020-03-17 | End: 2020-03-17

## 2020-03-17 RX ORDER — ALBUTEROL SULFATE 90 UG/1
2 AEROSOL, METERED RESPIRATORY (INHALATION)
Qty: 1 INHALER | Refills: 0 | Status: SHIPPED | OUTPATIENT
Start: 2020-03-17 | End: 2020-03-17

## 2020-03-17 RX ORDER — ALBUTEROL SULFATE 90 UG/1
2 AEROSOL, METERED RESPIRATORY (INHALATION)
Qty: 1 INHALER | Refills: 0 | Status: SHIPPED | OUTPATIENT
Start: 2020-03-17

## 2020-03-17 RX ORDER — FLUTICASONE PROPIONATE AND SALMETEROL 100; 50 UG/1; UG/1
1 POWDER RESPIRATORY (INHALATION) EVERY 12 HOURS
Qty: 1 INHALER | Refills: 0 | Status: SHIPPED | OUTPATIENT
Start: 2020-03-17

## 2020-03-17 NOTE — DISCHARGE INSTRUCTIONS
Patient Education        Viral Respiratory Infection: Care Instructions  Your Care Instructions    Viruses are very small organisms. They grow in number after they enter your body. There are many types that cause different illnesses, such as colds and the mumps. The symptoms of a viral respiratory infection often start quickly. They include a fever, sore throat, and runny nose. You may also just not feel well. Or you may not want to eat much. Most viral respiratory infections are not serious. They usually get better with time and self-care. Antibiotics are not used to treat a viral infection. That's because antibiotics will not help cure a viral illness. In some cases, antiviral medicine can help your body fight a serious viral infection. Follow-up care is a key part of your treatment and safety. Be sure to make and go to all appointments, and call your doctor if you are having problems. It's also a good idea to know your test results and keep a list of the medicines you take. How can you care for yourself at home? · Rest as much as possible until you feel better. · Be safe with medicines. Take your medicine exactly as prescribed. Call your doctor if you think you are having a problem with your medicine. You will get more details on the specific medicine your doctor prescribes. · Take an over-the-counter pain medicine, such as acetaminophen (Tylenol), ibuprofen (Advil, Motrin), or naproxen (Aleve), as needed for pain and fever. Read and follow all instructions on the label. Do not give aspirin to anyone younger than 20. It has been linked to Reye syndrome, a serious illness. · Drink plenty of fluids, enough so that your urine is light yellow or clear like water. Hot fluids, such as tea or soup, may help relieve congestion in your nose and throat. If you have kidney, heart, or liver disease and have to limit fluids, talk with your doctor before you increase the amount of fluids you drink.   · Try to clear mucus from your lungs by breathing deeply and coughing. · Gargle with warm salt water once an hour. This can help reduce swelling and throat pain. Use 1 teaspoon of salt mixed in 1 cup of warm water. · Do not smoke or allow others to smoke around you. If you need help quitting, talk to your doctor about stop-smoking programs and medicines. These can increase your chances of quitting for good. To avoid spreading the virus  · Cough or sneeze into a tissue. Then throw the tissue away. · If you don't have a tissue, use your hand to cover your cough or sneeze. Then clean your hand. You can also cough into your sleeve. · Wash your hands often. Use soap and warm water. Wash for 15 to 20 seconds each time. · If you don't have soap and water near you, you can clean your hands with alcohol wipes or gel. When should you call for help? Call your doctor now or seek immediate medical care if:    · You have a new or higher fever.     · Your fever lasts more than 48 hours.     · You have trouble breathing.     · You have a fever with a stiff neck or a severe headache.     · You are sensitive to light.     · You feel very sleepy or confused.    Watch closely for changes in your health, and be sure to contact your doctor if:    · You do not get better as expected. Where can you learn more? Go to http://chelsey-hali.info/  Enter O569 in the search box to learn more about \"Viral Respiratory Infection: Care Instructions. \"  Current as of: June 9, 2019Content Version: 12.4  © 9183-6865 [employer]wise[employer]. Care instructions adapted under license by [employer] (which disclaims liability or warranty for this information). If you have questions about a medical condition or this instruction, always ask your healthcare professional. [address] any warranty or liability for your use of this information.

## 2020-03-17 NOTE — ED TRIAGE NOTES
Pt ambulates to triage. Pt states that she has had a cough and not feeling well for approx one week. Pt states no fever. Pt states that her little sister has been quarantined by [facility]. Has symptoms, but did not perform COVID test. Chronic bronchitis and asthma since birth.

## 2020-03-17 NOTE — ED PROVIDER NOTES
Patient is here with nasal congestion, dry cough, and not feeling well for over a week now. Her sister is sick with the same. She did not get a flu shot. No chest pain or shortness of breath, abdominal pain, dizziness, dyspnea on exertion, orthopnea, neck pain/stiffness, rash, swelling of her arms or legs, trouble with urination or bowel movements or other symptoms. She was ambulatory to the room without difficulty, and well-hydrated. No foreign travel or COVID 19 exposure. The history is provided by the patient. Cough   This is a new problem. The current episode started more than 1 week ago. The problem occurs every few minutes. The problem has not changed since onset. The cough is non-productive. There has been no fever. Associated symptoms include rhinorrhea. Pertinent negatives include no chest pain, no chills, no sweats, no weight loss, no eye redness, no ear congestion, no ear pain, no headaches, no sore throat, no myalgias, no shortness of breath, no wheezing, no nausea, no vomiting and no confusion. She has tried nothing for the symptoms. She is a smoker. Her past medical history is significant for bronchitis and asthma. Her past medical history does not include pneumonia, bronchiectasis, COPD, emphysema, cancer, heart failure or CHF. Past Medical History:   Diagnosis Date    Asthma        History reviewed. No pertinent surgical history. History reviewed. No pertinent family history.     Social History     Socioeconomic History    Marital status: SINGLE     Spouse name: Not on file    Number of children: Not on file    Years of education: Not on file    Highest education level: Not on file   Occupational History    Not on file   Social Needs    Financial resource strain: Not on file    Food insecurity     Worry: Not on file     Inability: Not on file    Transportation needs     Medical: Not on file     Non-medical: Not on file   Tobacco Use    Smoking status: Current Every Day Smoker   Substance and Sexual Activity    Alcohol use: Yes    Drug use: Yes     Types: Marijuana    Sexual activity: Not on file   Lifestyle    Physical activity     Days per week: Not on file     Minutes per session: Not on file    Stress: Not on file   Relationships    Social connections     Talks on phone: Not on file     Gets together: Not on file     Attends Alevism service: Not on file     Active member of club or organization: Not on file     Attends meetings of clubs or organizations: Not on file     Relationship status: Not on file    Intimate partner violence     Fear of current or ex partner: Not on file     Emotionally abused: Not on file     Physically abused: Not on file     Forced sexual activity: Not on file   Other Topics Concern    Not on file   Social History Narrative    Not on file         ALLERGIES: Patient has no known allergies. Review of Systems   Constitutional: Negative. Negative for chills and weight loss. HENT: Positive for congestion and rhinorrhea. Negative for ear pain and sore throat. Eyes: Negative. Negative for redness. Respiratory: Positive for cough. Negative for shortness of breath and wheezing. Cardiovascular: Negative. Negative for chest pain. Gastrointestinal: Negative. Negative for nausea and vomiting. Genitourinary: Negative. Musculoskeletal: Negative. Negative for myalgias. Skin: Negative. Neurological: Negative. Negative for headaches. Psychiatric/Behavioral: Negative. Negative for confusion. All other systems reviewed and are negative. Vitals:    03/17/20 1501   BP: 129/79   Pulse: 98   Resp: 18   Temp: 98.9 °F (37.2 °C)   SpO2: 97%   Weight: 68 kg (150 lb)   Height: 5' 3\" (1.6 m)            Physical Exam  Vitals signs and nursing note reviewed. Constitutional:       Appearance: She is well-developed. HENT:      Head: Normocephalic and atraumatic.       Right Ear: External ear normal.      Left Ear: External ear normal.      Nose: Rhinorrhea present. Eyes:      Conjunctiva/sclera: Conjunctivae normal.      Pupils: Pupils are equal, round, and reactive to light. Neck:      Musculoskeletal: Normal range of motion and neck supple. Cardiovascular:      Rate and Rhythm: Normal rate and regular rhythm. Heart sounds: Normal heart sounds. Pulmonary:      Effort: Pulmonary effort is normal.      Breath sounds: Normal breath sounds. Abdominal:      General: Bowel sounds are normal.      Palpations: Abdomen is soft. Musculoskeletal: Normal range of motion. Skin:     General: Skin is warm and dry. Neurological:      Mental Status: She is alert and oriented to person, place, and time. Deep Tendon Reflexes: Reflexes are normal and symmetric. Psychiatric:         Behavior: Behavior normal.         Thought Content: Thought content normal.         Judgment: Judgment normal.          MDM  Number of Diagnoses or Management Options  Viral upper respiratory illness:   Risk of Complications, Morbidity, and/or Mortality  Presenting problems: moderate  Diagnostic procedures: moderate  Management options: moderate    Patient Progress  Patient progress: stable         Procedures    The patient was observed in the ED. Patient appears to have a viral infection today. Her vital signs are stable, and exam is unremarkable. She was encouraged to drink plenty of fluids, rest, follow-up with her primary care physician and return to the emergency room if worsening. Use medication as directed, if OTC or prescription meds were given. She does need a refill of her inhalers today for her asthma. She has not needed to use the inhalers. Her sister has had cold symptoms as well however the provider at [address] did not feel that Covid 19 testing was necessary. Patient does not have concerning symptoms for it today either. I did refill her inhalers for her.     I discussed the results of all labs, procedures, radiographs, and treatments with the patient and available family. Treatment plan is agreed upon and the patient is ready for discharge. All voiced understanding of the discharge plan and medication instructions or changes as appropriate. Questions about treatment in the ED were answered. All were encouraged to return should symptoms worsen or new problems develop.

## 2020-03-17 NOTE — LETTER
[address] [facility] EMERGENCY DEPT 
[address] [facility] [address] [address] 
[phone] Work/School Note Date: 3/17/2020 To Whom It May concern: 
 
Yancy Jade was seen and treated today in the emergency room by the following provider(s): 
Attending Provider: Lion Grady MD 
Physician Assistant: KENDALL Abarca.   
 
Yancy Jade may return to work after remaining at home in isolation for 7 days from symptom onset and then symptom free for 72 hours, then you may return to work. Sincerely, KENDALL Beth

## 2020-03-17 NOTE — ED NOTES
I have reviewed discharge instructions with the patient . The patient verbalized understanding. Patient left ED via ambulatory to home with self. Opportunities for questions and clarification provided. Patient given 2 scripts.

## 2020-12-21 ENCOUNTER — HOSPITAL ENCOUNTER (EMERGENCY)
Age: 30
Discharge: HOME | End: 2020-12-21
Attending: EMERGENCY MEDICINE

## 2020-12-21 VITALS
HEIGHT: 63 IN | RESPIRATION RATE: 17 BRPM | TEMPERATURE: 99.1 F | WEIGHT: 180 LBS | BODY MASS INDEX: 31.89 KG/M2 | SYSTOLIC BLOOD PRESSURE: 120 MMHG | HEART RATE: 90 BPM | DIASTOLIC BLOOD PRESSURE: 84 MMHG | OXYGEN SATURATION: 98 %

## 2020-12-21 DIAGNOSIS — L30.9 DERMATITIS: Primary | ICD-10-CM

## 2020-12-21 PROCEDURE — 99283 EMERGENCY DEPT VISIT LOW MDM: CPT

## 2020-12-21 RX ORDER — PREDNISONE 20 MG/1
20 TABLET ORAL DAILY
Qty: 5 TAB | Refills: 0 | Status: SHIPPED | OUTPATIENT
Start: 2020-12-21 | End: 2020-12-26

## 2020-12-21 RX ORDER — CEPHALEXIN 500 MG/1
500 CAPSULE ORAL 4 TIMES DAILY
Qty: 28 CAP | Refills: 0 | Status: SHIPPED | OUTPATIENT
Start: 2020-12-21 | End: 2020-12-28

## 2020-12-21 NOTE — LETTER
NOTIFICATION RETURN TO WORK / SCHOOL 
 
12/21/2020 9:46 PM 
 
Ms. Bailey Jean [address] 12 To Whom It May Concern: 
 
Bailey Jean is currently under the care of [facility] EMERGENCY DEPT. She will return to work/school on: 12/23/20 Bailey Jean may return to work/school with the following restrictions:  none If there are questions or concerns please have the patient contact our office. Sincerely, Uzair Be RN

## 2020-12-22 NOTE — ED TRIAGE NOTES
Pt comes in c/o blisters on both ears, face , abdomen and both inner thighs. Stated it started Wednesday last week , stated it started on both ears. Used Witch hazel on both ears and it got worse. Pt states it spread from her ears to both thighs and abdomen. Denied NVD, fever, cough or any other complaints. Denied starting any new meds or food. States she is not sure if shes had chicken pox.

## 2020-12-22 NOTE — DISCHARGE INSTRUCTIONS
As we discussed, we do not have the exact cause of your symptoms tonight in the emergency department. Therefore, it is important for you to follow-up with a primary care doctor and with a dermatologist for further evaluation. Please return with any fevers, spreading rash, swelling, worsening symptoms, or additional concerns.

## 2020-12-22 NOTE — ED PROVIDER NOTES
22-year-old female presents with multiple areas of skin eruptions. She says it seemed to start on her right ear with some blistering lesions that began to weep. Then spread to her left ear, her lower abdominal wall, and her bilateral upper inner thighs. She says it does not involve her mouth, nose, or vagina. She does not know of anything that she may have come into contact that would have gotten to those areas. She says that she has not changed any soaps or lotions. She denies any fevers or chills and had no difficulty breathing or trouble swallowing. No prior history of similar problems. Elements of this note were created using speech recognition software. As such, errors of speech recognition may be present. Past Medical History:   Diagnosis Date    Asthma        No past surgical history on file. No family history on file. Social History     Socioeconomic History    Marital status: SINGLE     Spouse name: Not on file    Number of children: Not on file    Years of education: Not on file    Highest education level: Not on file   Occupational History    Not on file   Social Needs    Financial resource strain: Not on file    Food insecurity     Worry: Not on file     Inability: Not on file    Transportation needs     Medical: Not on file     Non-medical: Not on file   Tobacco Use    Smoking status: Current Every Day Smoker   Substance and Sexual Activity    Alcohol use:  Yes    Drug use: Yes     Types: Marijuana    Sexual activity: Not on file   Lifestyle    Physical activity     Days per week: Not on file     Minutes per session: Not on file    Stress: Not on file   Relationships    Social connections     Talks on phone: Not on file     Gets together: Not on file     Attends Adventism service: Not on file     Active member of club or organization: Not on file     Attends meetings of clubs or organizations: Not on file     Relationship status: Not on file    Intimate partner violence     Fear of current or ex partner: Not on file     Emotionally abused: Not on file     Physically abused: Not on file     Forced sexual activity: Not on file   Other Topics Concern    Not on file   Social History Narrative    Not on file         ALLERGIES: Patient has no known allergies. Review of Systems   Constitutional: Negative for chills and fever. Respiratory: Negative for cough and shortness of breath. Gastrointestinal: Negative for nausea and vomiting. Musculoskeletal: Negative for arthralgias and myalgias. Skin: Positive for color change and rash. Vitals:    12/21/20 1945 12/21/20 2152   BP: 129/82 120/84   Pulse: (!) 102 90   Resp: 16 17   Temp: 99.9 °F (37.7 °C) 99.1 °F (37.3 °C)   SpO2: 97% 98%   Weight: 81.6 kg (180 lb)    Height: 5' 3\" (1.6 m)             Physical Exam  Vitals signs and nursing note reviewed. Constitutional:       Appearance: Normal appearance. Cardiovascular:      Rate and Rhythm: Normal rate and regular rhythm. Skin:     Comments: Crusted over honey colored lesion on her right ear. She also has some blistering lesions on her lower abdomen just below her umbilicus. On her bilateral inner thighs she also has some healed over lesions. Neurological:      General: No focal deficit present. Mental Status: She is alert and oriented to person, place, and time. MDM  Number of Diagnoses or Management Options  Dermatitis  Diagnosis management comments: Am uncertain about the exact nature of her symptoms. It is possible that she has a nickel allergy in her ears may have been from an earring and where it is on her waist could have been from a belt or her pants. On her upper inner thighs I am not sure where she could have come in contact with nickel and that she was wearing some sort of unusual pair of pants. Potentially she may have some abnormal folliculitis.   I advised her that I did not have the exact answer for her symptoms but that we would try to treat with steroids and antibiotics to cover a variety of things.   I did encourage her to follow-up with primary care doctor and a dermatologist.         Procedures

## 2020-12-22 NOTE — ED NOTES
I have reviewed discharge instructions with the patient. The patient verbalized understanding. Patient left ED via Discharge Method: ambulatory to Home with self  Opportunity for questions and clarification provided. Patient given 2 scripts. To continue your aftercare when you leave the hospital, you may receive an automated call from our care team to check in on how you are doing. This is a free service and part of our promise to provide the best care and service to meet your aftercare needs.  If you have questions, or wish to unsubscribe from this service please call [phone]. Thank you for Choosing our [facility] Emergency Department.

## 2022-08-09 VITALS
DIASTOLIC BLOOD PRESSURE: 80 MMHG | HEART RATE: 97 BPM | SYSTOLIC BLOOD PRESSURE: 126 MMHG | HEIGHT: 63 IN | TEMPERATURE: 98.8 F | OXYGEN SATURATION: 98 % | RESPIRATION RATE: 20 BRPM | BODY MASS INDEX: 38.98 KG/M2 | WEIGHT: 220 LBS

## 2022-08-09 PROCEDURE — 29125 APPL SHORT ARM SPLINT STATIC: CPT

## 2022-08-09 PROCEDURE — 99283 EMERGENCY DEPT VISIT LOW MDM: CPT

## 2022-08-09 ASSESSMENT — PAIN DESCRIPTION - ORIENTATION: ORIENTATION: RIGHT

## 2022-08-09 ASSESSMENT — PAIN - FUNCTIONAL ASSESSMENT: PAIN_FUNCTIONAL_ASSESSMENT: 0-10

## 2022-08-09 ASSESSMENT — PAIN SCALES - GENERAL: PAINLEVEL_OUTOF10: 8

## 2022-08-09 ASSESSMENT — PAIN DESCRIPTION - LOCATION: LOCATION: WRIST

## 2022-08-10 ENCOUNTER — HOSPITAL ENCOUNTER (EMERGENCY)
Age: 32
Discharge: HOME | End: 2022-08-10
Attending: STUDENT IN AN ORGANIZED HEALTH CARE EDUCATION/TRAINING PROGRAM
Payer: COMMERCIAL

## 2022-08-10 ENCOUNTER — HOSPITAL ENCOUNTER (EMERGENCY)
Dept: GENERAL RADIOLOGY | Age: 32
Discharge: HOME | End: 2022-08-13
Payer: COMMERCIAL

## 2022-08-10 DIAGNOSIS — S63.501A SPRAIN OF RIGHT WRIST, INITIAL ENCOUNTER: ICD-10-CM

## 2022-08-10 DIAGNOSIS — M25.531 RIGHT WRIST PAIN: Primary | ICD-10-CM

## 2022-08-10 PROCEDURE — 73110 X-RAY EXAM OF WRIST: CPT

## 2022-08-10 RX ORDER — DICLOFENAC POTASSIUM 50 MG/1
50 TABLET, FILM COATED ORAL 3 TIMES DAILY
Qty: 21 TABLET | Refills: 0 | Status: SHIPPED | OUTPATIENT
Start: 2022-08-10 | End: 2022-08-17

## 2022-08-10 ASSESSMENT — ENCOUNTER SYMPTOMS
BACK PAIN: 0
VOMITING: 0
NAUSEA: 0
ABDOMINAL PAIN: 0
SHORTNESS OF BREATH: 0
COLOR CHANGE: 0

## 2022-08-10 NOTE — ED PROVIDER NOTES
[facility] Emergency Department Provider Note                   PCP:                None Provider               Age: 32 y.o. Sex: female       ICD-10-CM    1. Right wrist pain  M25.531       2. Sprain of right wrist, initial encounter  S63.501A           DISPOSITION Decision To Discharge 08/10/2022 12:44:49 AM        MDM  Number of Diagnoses or Management Options  Right wrist pain  Sprain of right wrist, initial encounter  Diagnosis management comments: Patient is a 40-year-old female who presents today with acute right wrist pain. Patient no acute distress. Vital stable. X-rays negative for acute fracture or other bony abnormality. Physical exam demonstrates a wrist with full range of motion but is a little bit tender to palpation diffusely. Likely a wrist strain/sprain. Placed the patient in a Velcro volar wrist splint. Prescribed anti-inflammatories for next week. Encouraged ice elevation and compression when possible. Encouraged follow-up with family doctor or if needed return to the ED. Patient reports understanding and is agreeable to the plan as discussed. Patient discharged out of the department with brace in place. Amount and/or Complexity of Data Reviewed  Tests in the radiology section of CPT®: ordered and reviewed  Review and summarize past medical records: yes  Independent visualization of images, tracings, or specimens: yes    Risk of Complications, Morbidity, and/or Mortality  Presenting problems: low  Diagnostic procedures: low  Management options: low  General comments: XR WRIST RIGHT (MIN 3 VIEWS)   Final Result    Negative study.          Patient Progress  Patient progress: stable       Orders Placed This Encounter   Procedures    Volar Wrist Splint    XR WRIST RIGHT (MIN 3 VIEWS)        Sylvester Ye is a 32 y.o. female who presents to the Emergency Department with chief complaint of    Chief Complaint   Patient presents with    Wrist Pain      Patient is a 40-year-old which have NOT CHANGED    Details   albuterol sulfate  (90 Base) MCG/ACT inhaler Inhale 2 puffs into the lungs every 4 hours as neededHistorical Med      butalbital-APAP-caffeine -40 MG CAPS per capsule Take 1-2 capsules by mouth every 6 hours as neededHistorical Med      fluticasone-salmeterol (ADVAIR DISKUS) 100-50 MCG/ACT AEPB diskus inhaler Inhale 1 puff into the lungs every 12 hoursHistorical Med      naproxen sodium (ANAPROX) 550 MG tablet Take 550 mg by mouth 2 times daily (with meals)Historical Med      ondansetron (ZOFRAN-ODT) 4 MG disintegrating tablet Take 4 mg by mouth every 8 hours as neededHistorical Med              Vitals signs and nursing note reviewed. Patient Vitals for the past 4 hrs:   Temp Pulse Resp BP SpO2   08/09/22 2326 98.8 °F (37.1 °C) 97 20 126/80 98 %          Physical Exam  Vitals and nursing note reviewed. Constitutional:       General: She is not in acute distress. Appearance: Normal appearance. She is not ill-appearing. HENT:      Head: Normocephalic and atraumatic. Right Ear: External ear normal.      Left Ear: External ear normal.   Eyes:      Extraocular Movements: Extraocular movements intact. Conjunctiva/sclera: Conjunctivae normal.   Cardiovascular:      Rate and Rhythm: Normal rate and regular rhythm. Pulses: Normal pulses. Heart sounds: Normal heart sounds. Pulmonary:      Effort: Pulmonary effort is normal.      Breath sounds: Normal breath sounds. Abdominal:      General: Abdomen is flat. Musculoskeletal:         General: Tenderness (right wrist) present. No swelling or deformity. Normal range of motion. Cervical back: Normal range of motion. Skin:     General: Skin is warm and dry. Capillary Refill: Capillary refill takes less than 2 seconds. Findings: No bruising or erythema. Neurological:      General: No focal deficit present. Mental Status: She is alert and oriented to person, place, and time. Psychiatric:         Mood and Affect: Mood normal.         Behavior: Behavior normal.        Procedures      Labs Reviewed - No data to display     XR WRIST RIGHT (MIN 3 VIEWS)   Final Result   Negative study. Voice dictation software was used during the making of this note. This software is not perfect and grammatical and other typographical errors may be present. This note has not been completely proofread for errors.      [employer], GEENA  08/10/22 [id number]

## 2022-08-10 NOTE — ED NOTES
I have reviewed discharge instructions with the patient. The patient verbalized understanding. Patient left ED via Discharge Method: ambulatory to Home with self. Opportunity for questions and clarification provided. Patient given 1 scripts. To continue your aftercare when you leave the hospital, you may receive an automated call from our care team to check in on how you are doing. This is a free service and part of our promise to provide the best care and service to meet your aftercare needs.  If you have questions, or wish to unsubscribe from this service please call [phone]. Thank you for Choosing our [facility] Emergency Department.       Yamilet Willis RN  08/10/22 0100

## 2022-08-10 NOTE — DISCHARGE INSTRUCTIONS
X-rays negative here today. Likely a wrist sprain. Wear the brace at all times for the next week to help give stability and alleviate discomfort. Take the anti-inflammatories as prescribed. Ice and elevate when at home after long days. Follow-up with family doctor. Return to ER as needed.

## 2022-08-10 NOTE — ED TRIAGE NOTES
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 Able to move it with pain.

## 2023-02-19 ENCOUNTER — HOSPITAL ENCOUNTER (EMERGENCY)
Age: 33
Discharge: HOME | End: 2023-02-19
Attending: EMERGENCY MEDICINE
Payer: COMMERCIAL

## 2023-02-19 VITALS
OXYGEN SATURATION: 98 % | SYSTOLIC BLOOD PRESSURE: 132 MMHG | RESPIRATION RATE: 14 BRPM | HEART RATE: 100 BPM | TEMPERATURE: 97.9 F | DIASTOLIC BLOOD PRESSURE: 87 MMHG | WEIGHT: 220.4 LBS | HEIGHT: 63 IN | BODY MASS INDEX: 39.05 KG/M2

## 2023-02-19 DIAGNOSIS — F10.920 ACUTE ALCOHOLIC INTOXICATION WITHOUT COMPLICATION (HCC): Primary | ICD-10-CM

## 2023-02-19 DIAGNOSIS — R40.4 TRANSIENT ALTERATION OF AWARENESS: ICD-10-CM

## 2023-02-19 LAB
ALBUMIN SERPL-MCNC: 4 G/DL (ref 3.5–5)
ALBUMIN/GLOB SERPL: 0.9 (ref 0.4–1.6)
ALP SERPL-CCNC: 67 U/L (ref 50–136)
ALT SERPL-CCNC: 31 U/L (ref 12–65)
AMPHET UR QL SCN: POSITIVE
ANION GAP SERPL CALC-SCNC: 4 MMOL/L (ref 2–11)
AST SERPL-CCNC: 24 U/L (ref 15–37)
BASOPHILS # BLD: 0 K/UL (ref 0–0.2)
BASOPHILS NFR BLD: 1 % (ref 0–2)
BENZODIAZ UR QL: NEGATIVE
BILIRUB SERPL-MCNC: 0.3 MG/DL (ref 0.2–1.1)
BILIRUB UR QL: NEGATIVE
BUN SERPL-MCNC: 11 MG/DL (ref 6–23)
CALCIUM SERPL-MCNC: 8.6 MG/DL (ref 8.3–10.4)
CANNABINOIDS UR QL SCN: POSITIVE
CHLORIDE SERPL-SCNC: 113 MMOL/L (ref 101–110)
CO2 SERPL-SCNC: 26 MMOL/L (ref 21–32)
COCAINE UR QL SCN: NEGATIVE
CREAT SERPL-MCNC: 0.7 MG/DL (ref 0.6–1)
DIFFERENTIAL METHOD BLD: NORMAL
EKG ATRIAL RATE: 102 BPM
EKG DIAGNOSIS: NORMAL
EKG P AXIS: 75 DEGREES
EKG P-R INTERVAL: 132 MS
EKG Q-T INTERVAL: 352 MS
EKG QRS DURATION: 94 MS
EKG QTC CALCULATION (BAZETT): 458 MS
EKG R AXIS: 68 DEGREES
EKG T AXIS: 39 DEGREES
EKG VENTRICULAR RATE: 102 BPM
EOSINOPHIL # BLD: 0.1 K/UL (ref 0–0.8)
EOSINOPHIL NFR BLD: 1 % (ref 0.5–7.8)
ERYTHROCYTE [DISTWIDTH] IN BLOOD BY AUTOMATED COUNT: 13.8 % (ref 11.9–14.6)
ETHANOL SERPL-MCNC: 189 MG/DL (ref 0–0.08)
GLOBULIN SER CALC-MCNC: 4.4 G/DL (ref 2.8–4.5)
GLUCOSE SERPL-MCNC: 97 MG/DL (ref 65–100)
GLUCOSE UR QL STRIP.AUTO: NEGATIVE MG/DL
HCG UR QL: NEGATIVE
HCT VFR BLD AUTO: 44.5 % (ref 35.8–46.3)
HGB BLD-MCNC: 14.2 G/DL (ref 11.7–15.4)
IMM GRANULOCYTES # BLD AUTO: 0 K/UL (ref 0–0.5)
IMM GRANULOCYTES NFR BLD AUTO: 0 % (ref 0–5)
KETONES UR-MCNC: NEGATIVE MG/DL
LEUKOCYTE ESTERASE UR QL STRIP: NEGATIVE
LYMPHOCYTES # BLD: 2 K/UL (ref 0.5–4.6)
LYMPHOCYTES NFR BLD: 29 % (ref 13–44)
MCH RBC QN AUTO: 30.2 PG (ref 26.1–32.9)
MCHC RBC AUTO-ENTMCNC: 31.9 G/DL (ref 31.4–35)
MCV RBC AUTO: 94.7 FL (ref 82–102)
MONOCYTES # BLD: 0.5 K/UL (ref 0.1–1.3)
MONOCYTES NFR BLD: 7 % (ref 4–12)
NEUTS SEG # BLD: 4.3 K/UL (ref 1.7–8.2)
NEUTS SEG NFR BLD: 62 % (ref 43–78)
NITRITE UR QL: POSITIVE
NRBC # BLD: 0 K/UL (ref 0–0.2)
OPIATES UR QL: NEGATIVE
PH UR: 5.5 (ref 5–9)
PLATELET # BLD AUTO: 269 K/UL (ref 150–450)
PMV BLD AUTO: 9.8 FL (ref 9.4–12.3)
POTASSIUM SERPL-SCNC: 3.8 MMOL/L (ref 3.5–5.1)
PROT SERPL-MCNC: 8.4 G/DL (ref 6.3–8.2)
PROT UR QL: NEGATIVE MG/DL
RBC # BLD AUTO: 4.7 M/UL (ref 4.05–5.2)
RBC # UR STRIP: ABNORMAL
SERVICE CMNT-IMP: ABNORMAL
SODIUM SERPL-SCNC: 143 MMOL/L (ref 133–143)
SP GR UR: >1.03 (ref 1–1.02)
UROBILINOGEN UR QL: 0.2 EU/DL (ref 0.2–1)
WBC # BLD AUTO: 6.9 K/UL (ref 4.3–11.1)

## 2023-02-19 PROCEDURE — 85025 COMPLETE CBC W/AUTO DIFF WBC: CPT

## 2023-02-19 PROCEDURE — 82077 ASSAY SPEC XCP UR&BREATH IA: CPT

## 2023-02-19 PROCEDURE — 80307 DRUG TEST PRSMV CHEM ANLYZR: CPT

## 2023-02-19 PROCEDURE — 81025 URINE PREGNANCY TEST: CPT

## 2023-02-19 PROCEDURE — 80053 COMPREHEN METABOLIC PANEL: CPT

## 2023-02-19 PROCEDURE — 81003 URINALYSIS AUTO W/O SCOPE: CPT

## 2023-02-19 PROCEDURE — 93005 ELECTROCARDIOGRAM TRACING: CPT | Performed by: EMERGENCY MEDICINE

## 2023-02-19 PROCEDURE — 99284 EMERGENCY DEPT VISIT MOD MDM: CPT

## 2023-02-19 ASSESSMENT — ENCOUNTER SYMPTOMS
VOMITING: 0
COUGH: 0
ABDOMINAL PAIN: 0
COLOR CHANGE: 0
SHORTNESS OF BREATH: 0

## 2023-02-19 ASSESSMENT — PAIN SCALES - GENERAL: PAINLEVEL_OUTOF10: 0

## 2023-02-19 ASSESSMENT — PAIN - FUNCTIONAL ASSESSMENT: PAIN_FUNCTIONAL_ASSESSMENT: 0-10

## 2023-02-19 ASSESSMENT — LIFESTYLE VARIABLES
HOW MANY STANDARD DRINKS CONTAINING ALCOHOL DO YOU HAVE ON A TYPICAL DAY: 3 OR 4
HOW OFTEN DO YOU HAVE A DRINK CONTAINING ALCOHOL: 2-4 TIMES A MONTH

## 2023-02-19 NOTE — ED NOTES
I have reviewed discharge instructions with the patient. The patient verbalized understanding. Patient left ED via Discharge Method: ambulatory to Home with friend. Opportunity for questions and clarification provided. Patient given 0 scripts. To continue your aftercare when you leave the hospital, you may receive an automated call from our care team to check in on how you are doing. This is a free service and part of our promise to provide the best care and service to meet your aftercare needs.  If you have questions, or wish to unsubscribe from this service please call [phone]. Thank you for Choosing our [address] [place] [address] Emergency Department.         Madhuri Sheridan RN  02/19/23 5080

## 2023-02-19 NOTE — ED TRIAGE NOTES
Pt arrives to ED via EMS ambulatory. EMS reports pt was \"crawling around\" outdoors. GCS 15, AAOx4, pt states she did drink some alcohol last night and smoke mariajuana. Denies pain, discomfort, complaints, SI or HI.  States she would like a pregnancy test

## 2023-02-19 NOTE — ED PROVIDER NOTES
Emergency Department Provider Note                   PCP:                None None               Age: 28 y.o. Sex: female       ICD-10-CM    1. Acute alcoholic intoxication without complication (HCC)  B39.114       2. Transient alteration of awareness  R40.4           DISPOSITION Decision To Discharge 02/19/2023 10:37:52 AM        Medical Decision Making  Altered mental status in a patient who is picked up on the roadside. Patient was a    Problems Addressed:  Acute alcoholic intoxication without complication ([facility] [address]): acute illness or injury with systemic symptoms  Transient alteration of awareness: acute illness or injury    Amount and/or Complexity of Data Reviewed  Independent Historian: EMS     Details: EMS stated patient ambulatory the scene with good blood pressure. Patient at a recollection of the previous few hours. Labs: ordered. Details: Labs essentially normal except for elevated alcohol  ECG/medicine tests: ordered and independent interpretation performed. Details: My interpretation of EKG shows time is tachycardia 102. No STT changes. Nowhere to be. On the heating air OK    Risk  Diagnosis or treatment significantly limited by social determinants of health. Risk Details: Patient with alcohol marijuana use. She decline                                Orders Placed This Encounter   Procedures    CBC with Auto Differential    Comprehensive Metabolic Panel    Ethanol    Drug Screen Psych, Urine    POC PREGNANCY UR-QUAL    POCT Urinalysis no Micro    POC Pregnancy Urine Qual    EKG 12 Lead        Medications - No data to display    Discharge Medication List as of 2/19/2023 10:44 AM           Devyn Avendaño is a 28 y.o. female who presents to the Emergency Department with chief complaint of    Chief Complaint   Patient presents with    Other     Found outside crawling around per EMS reports. Pt denies complaints      28year old, female, running by EMS.  EMS states they were called out due to a patient laying beside the road. When they arrived, patient was alert and amatory but confused. Patient denies any trauma. I'm sure if pregnancy. It has a chronic medical problems. So she did have some alcohol and marijuana last evening. Denies any injuries. No recent fever, shortness of breath, chest pain, abdominal pain, and vomiting for. The history is provided by the patient. Review of Systems   Constitutional:  Negative for chills and fever. Respiratory:  Negative for cough and shortness of breath. Cardiovascular:  Negative for chest pain and palpitations. Gastrointestinal:  Negative for abdominal pain and vomiting. Skin:  Negative for color change and rash. Neurological:  Negative for dizziness, syncope, weakness, numbness and headaches. Psychiatric/Behavioral:  Positive for confusion. Past Medical History:   Diagnosis Date    Asthma         No past surgical history on file. No family history on file. Social History     Socioeconomic History    Marital status: Single   Tobacco Use    Smoking status: Every Day   Substance and Sexual Activity    Alcohol use: Yes    Drug use: Yes     Types: Marijuana Brittany Legions)         Patient has no known allergies. Discharge Medication List as of 2/19/2023 10:44 AM        CONTINUE these medications which have NOT CHANGED    Details   diclofenac (CATAFLAM) 50 MG tablet Take 1 tablet by mouth in the morning and 1 tablet at noon and 1 tablet before bedtime. Do all this for 7 days. , Disp-21 tablet, R-0Print      albuterol sulfate  (90 Base) MCG/ACT inhaler Inhale 2 puffs into the lungs every 4 hours as neededHistorical Med      butalbital-APAP-caffeine -40 MG CAPS per capsule Take 1-2 capsules by mouth every 6 hours as neededHistorical Med      fluticasone-salmeterol (ADVAIR DISKUS) 100-50 MCG/ACT AEPB diskus inhaler Inhale 1 puff into the lungs every 12 hoursHistorical Med      naproxen sodium (ANAPROX) 550 MG tablet Take 550 mg by mouth 2 times daily (with meals)Historical Med      ondansetron (ZOFRAN-ODT) 4 MG disintegrating tablet Take 4 mg by mouth every 8 hours as neededHistorical Med              Vitals signs and nursing note reviewed. No data found. Physical Exam  Vitals and nursing note reviewed. Constitutional:       Appearance: She is not ill-appearing. HENT:      Head: Normocephalic and atraumatic. Right Ear: External ear normal.      Left Ear: External ear normal.      Mouth/Throat:      Mouth: Mucous membranes are moist.      Pharynx: Oropharynx is clear. Eyes:      General: No scleral icterus. Extraocular Movements: Extraocular movements intact. Pupils: Pupils are equal, round, and reactive to light. Cardiovascular:      Rate and Rhythm: Normal rate and regular rhythm. Pulmonary:      Effort: Pulmonary effort is normal.      Breath sounds: Normal breath sounds. Abdominal:      Palpations: Abdomen is soft. Tenderness: There is no abdominal tenderness. Musculoskeletal:         General: No swelling or tenderness. Right lower leg: No edema. Left lower leg: No edema. Skin:     General: Skin is warm and dry. Neurological:      General: No focal deficit present. Mental Status: She is alert.         Procedures    Results for orders placed or performed during the hospital encounter of 02/19/23   CBC with Auto Differential   Result Value Ref Range    WBC 6.9 4.3 - 11.1 K/uL    RBC 4.70 4.05 - 5.2 M/uL    Hemoglobin 14.2 11.7 - 15.4 g/dL    Hematocrit 44.5 35.8 - 46.3 %    MCV 94.7 82 - 102 FL    MCH 30.2 26.1 - 32.9 PG    MCHC 31.9 31.4 - 35.0 g/dL    RDW 13.8 11.9 - 14.6 %    Platelets 697 441 - 189 K/uL    MPV 9.8 9.4 - 12.3 FL    nRBC 0.00 0.0 - 0.2 K/uL    Differential Type AUTOMATED      Seg Neutrophils 62 43 - 78 %    Lymphocytes 29 13 - 44 %    Monocytes 7 4.0 - 12.0 %    Eosinophils % 1 0.5 - 7.8 %    Basophils 1 0.0 - 2.0 %    Immature Granulocytes 0 0.0 - 5.0 %    Segs Absolute 4.3 1.7 - 8.2 K/UL    Absolute Lymph # 2.0 0.5 - 4.6 K/UL    Absolute Mono # 0.5 0.1 - 1.3 K/UL    Absolute Eos # 0.1 0.0 - 0.8 K/UL    Basophils Absolute 0.0 0.0 - 0.2 K/UL    Absolute Immature Granulocyte 0.0 0.0 - 0.5 K/UL   Comprehensive Metabolic Panel   Result Value Ref Range    Sodium 143 133 - 143 mmol/L    Potassium 3.8 3.5 - 5.1 mmol/L    Chloride 113 (H) 101 - 110 mmol/L    CO2 26 21 - 32 mmol/L    Anion Gap 4 2 - 11 mmol/L    Glucose 97 65 - 100 mg/dL    BUN 11 6 - 23 MG/DL    Creatinine 0.70 0.6 - 1.0 MG/DL    Est, Glom Filt Rate >60 >60 ml/min/1.73m2    Calcium 8.6 8.3 - 10.4 MG/DL    Total Bilirubin 0.3 0.2 - 1.1 MG/DL    ALT 31 12 - 65 U/L    AST 24 15 - 37 U/L    Alk Phosphatase 67 50 - 136 U/L    Total Protein 8.4 (H) 6.3 - 8.2 g/dL    Albumin 4.0 3.5 - 5.0 g/dL    Globulin 4.4 2.8 - 4.5 g/dL    Albumin/Globulin Ratio 0.9 0.4 - 1.6     Ethanol   Result Value Ref Range    Ethanol Lvl 189 MG/DL   Drug Screen Psych, Urine   Result Value Ref Range    Benzodiazepines, Urine Negative NEG      Opiates, Urine Negative NEG      Amphetamine, Urine Positive (A) NEG      Cocaine, Urine Negative NEG      THC, TH-Cannabinol, Urine Positive (A) NEG     POCT Urinalysis no Micro   Result Value Ref Range    Specific Gravity, Urine, POC >1.030 (H) 1.001 - 1.023    pH, Urine, POC 5.5 5.0 - 9.0      Protein, Urine, POC Negative NEG mg/dL    Glucose, UA POC Negative NEG mg/dL    Ketones, Urine, POC Negative NEG mg/dL    Bilirubin, Urine, POC Negative NEG      Blood, UA POC Trace Intact (A) NEG      URINE UROBILINOGEN POC 0.2 0.2 - 1.0 EU/dL    Nitrite, Urine, POC Positive (A) NEG      Leukocyte Est, UA POC Negative NEG      Performed by: Monie Barros    POC Pregnancy Urine Qual   Result Value Ref Range    Preg Test, Ur Negative NEG     EKG 12 Lead   Result Value Ref Range    Ventricular Rate 102 BPM    Atrial Rate 102 BPM    P-R Interval 132 ms    QRS Duration 94 ms    Q-T Interval 352 ms    QTc Calculation (Peggy) 458 ms    P Axis 75 degrees    R Axis 68 degrees    T Axis 39 degrees    Diagnosis Sinus tachycardia         No orders to display             9:46 PM  Feel sleepy but states feels fine. Normal speech and gait. Suspect main issue was too much alcohol last night. Voice dictation software was used during the making of this note. This software is not perfect and grammatical and other typographical errors may be present. This note has not been completely proofread for errors.      Pam Zaidi MD  02/19/23 4555

## 2023-02-19 NOTE — ED TRIAGE NOTES
Pt arrives via [facility]. Pt was outside for unknown amount of time. Civilian called EMS due to patient crawling on ground. NAD.

## 2023-02-19 NOTE — DISCHARGE INSTRUCTIONS
Decrease alcohol use. If you feel it is a problem, may contact the resources below. We would love to help you get a primary care doctor for follow-up after your emergency department visit. Please call [phone] between 7AM - 6PM Monday to Friday. A care navigator will be able to assist you with setting up a doctor close to your home. Silvia Rae   [phone]   They have multiple resources to help you. Please call.  [email or web address]     Other local resources that are available are: The [address] [email or web address]. org for inpatient and outpatient substance abuse issues. [facility] [phone]  Medication assisted treatment    [address] [place] [address]  [phone]     Suicide Hotline   2-702-RFNDGQB     Narcotics Anonymous   www.na. org  Alcoholics Anonymous  www.aa.org

## 2023-02-21 ENCOUNTER — APPOINTMENT (OUTPATIENT)
Dept: CT IMAGING | Age: 33
End: 2023-02-21
Payer: COMMERCIAL

## 2023-02-21 ENCOUNTER — HOSPITAL ENCOUNTER (EMERGENCY)
Age: 33
Discharge: HOME | End: 2023-02-21
Attending: STUDENT IN AN ORGANIZED HEALTH CARE EDUCATION/TRAINING PROGRAM
Payer: COMMERCIAL

## 2023-02-21 VITALS
TEMPERATURE: 97.9 F | WEIGHT: 220 LBS | HEIGHT: 63 IN | SYSTOLIC BLOOD PRESSURE: 109 MMHG | DIASTOLIC BLOOD PRESSURE: 72 MMHG | RESPIRATION RATE: 15 BRPM | HEART RATE: 90 BPM | OXYGEN SATURATION: 97 % | BODY MASS INDEX: 38.98 KG/M2

## 2023-02-21 DIAGNOSIS — K02.9 DENTAL CARIES: Primary | ICD-10-CM

## 2023-02-21 DIAGNOSIS — L03.211 FACIAL CELLULITIS: ICD-10-CM

## 2023-02-21 LAB
ALBUMIN SERPL-MCNC: 4.1 G/DL (ref 3.5–5)
ALBUMIN/GLOB SERPL: 0.9 (ref 0.4–1.6)
ALP SERPL-CCNC: 69 U/L (ref 50–136)
ALT SERPL-CCNC: 47 U/L (ref 12–65)
ANION GAP SERPL CALC-SCNC: 5 MMOL/L (ref 2–11)
AST SERPL-CCNC: 22 U/L (ref 15–37)
BASOPHILS # BLD: 0 K/UL (ref 0–0.2)
BASOPHILS NFR BLD: 0 % (ref 0–2)
BILIRUB SERPL-MCNC: 0.7 MG/DL (ref 0.2–1.1)
BUN SERPL-MCNC: 8 MG/DL (ref 6–23)
CALCIUM SERPL-MCNC: 9.7 MG/DL (ref 8.3–10.4)
CHLORIDE SERPL-SCNC: 109 MMOL/L (ref 101–110)
CO2 SERPL-SCNC: 22 MMOL/L (ref 21–32)
CREAT SERPL-MCNC: 0.8 MG/DL (ref 0.6–1)
DIFFERENTIAL METHOD BLD: ABNORMAL
EOSINOPHIL # BLD: 0 K/UL (ref 0–0.8)
EOSINOPHIL NFR BLD: 0 % (ref 0.5–7.8)
ERYTHROCYTE [DISTWIDTH] IN BLOOD BY AUTOMATED COUNT: 13.5 % (ref 11.9–14.6)
GLOBULIN SER CALC-MCNC: 4.7 G/DL (ref 2.8–4.5)
GLUCOSE SERPL-MCNC: 125 MG/DL (ref 65–100)
HCT VFR BLD AUTO: 44.1 % (ref 35.8–46.3)
HGB BLD-MCNC: 14.7 G/DL (ref 11.7–15.4)
IMM GRANULOCYTES # BLD AUTO: 0 K/UL (ref 0–0.5)
IMM GRANULOCYTES NFR BLD AUTO: 0 % (ref 0–5)
LACTATE SERPL-SCNC: 1.3 MMOL/L (ref 0.4–2)
LYMPHOCYTES # BLD: 1.3 K/UL (ref 0.5–4.6)
LYMPHOCYTES NFR BLD: 17 % (ref 13–44)
MCH RBC QN AUTO: 30 PG (ref 26.1–32.9)
MCHC RBC AUTO-ENTMCNC: 33.3 G/DL (ref 31.4–35)
MCV RBC AUTO: 90 FL (ref 82–102)
MONOCYTES # BLD: 0.9 K/UL (ref 0.1–1.3)
MONOCYTES NFR BLD: 11 % (ref 4–12)
NEUTS SEG # BLD: 5.6 K/UL (ref 1.7–8.2)
NEUTS SEG NFR BLD: 71 % (ref 43–78)
NRBC # BLD: 0 K/UL (ref 0–0.2)
PLATELET # BLD AUTO: 265 K/UL (ref 150–450)
PMV BLD AUTO: 9.8 FL (ref 9.4–12.3)
POTASSIUM SERPL-SCNC: 3.5 MMOL/L (ref 3.5–5.1)
PROCALCITONIN SERPL-MCNC: <0.05 NG/ML (ref 0–0.49)
PROT SERPL-MCNC: 8.8 G/DL (ref 6.3–8.2)
RBC # BLD AUTO: 4.9 M/UL (ref 4.05–5.2)
SODIUM SERPL-SCNC: 136 MMOL/L (ref 133–143)
WBC # BLD AUTO: 7.8 K/UL (ref 4.3–11.1)

## 2023-02-21 PROCEDURE — 87040 BLOOD CULTURE FOR BACTERIA: CPT

## 2023-02-21 PROCEDURE — 96375 TX/PRO/DX INJ NEW DRUG ADDON: CPT

## 2023-02-21 PROCEDURE — 6360000004 HC RX CONTRAST MEDICATION: Performed by: STUDENT IN AN ORGANIZED HEALTH CARE EDUCATION/TRAINING PROGRAM

## 2023-02-21 PROCEDURE — 96366 THER/PROPH/DIAG IV INF ADDON: CPT

## 2023-02-21 PROCEDURE — 2580000003 HC RX 258: Performed by: STUDENT IN AN ORGANIZED HEALTH CARE EDUCATION/TRAINING PROGRAM

## 2023-02-21 PROCEDURE — 70487 CT MAXILLOFACIAL W/DYE: CPT

## 2023-02-21 PROCEDURE — 2500000003 HC RX 250 WO HCPCS: Performed by: STUDENT IN AN ORGANIZED HEALTH CARE EDUCATION/TRAINING PROGRAM

## 2023-02-21 PROCEDURE — 96365 THER/PROPH/DIAG IV INF INIT: CPT

## 2023-02-21 PROCEDURE — 80053 COMPREHEN METABOLIC PANEL: CPT

## 2023-02-21 PROCEDURE — 99285 EMERGENCY DEPT VISIT HI MDM: CPT

## 2023-02-21 PROCEDURE — 84145 PROCALCITONIN (PCT): CPT

## 2023-02-21 PROCEDURE — 83605 ASSAY OF LACTIC ACID: CPT

## 2023-02-21 PROCEDURE — 6360000002 HC RX W HCPCS: Performed by: STUDENT IN AN ORGANIZED HEALTH CARE EDUCATION/TRAINING PROGRAM

## 2023-02-21 PROCEDURE — 85025 COMPLETE CBC W/AUTO DIFF WBC: CPT

## 2023-02-21 PROCEDURE — 6370000000 HC RX 637 (ALT 250 FOR IP): Performed by: STUDENT IN AN ORGANIZED HEALTH CARE EDUCATION/TRAINING PROGRAM

## 2023-02-21 RX ORDER — 0.9 % SODIUM CHLORIDE 0.9 %
1000 INTRAVENOUS SOLUTION INTRAVENOUS
Status: COMPLETED | OUTPATIENT
Start: 2023-02-21 | End: 2023-02-21

## 2023-02-21 RX ORDER — KETOROLAC TROMETHAMINE 15 MG/ML
15 INJECTION, SOLUTION INTRAMUSCULAR; INTRAVENOUS ONCE
Status: COMPLETED | OUTPATIENT
Start: 2023-02-21 | End: 2023-02-21

## 2023-02-21 RX ORDER — 0.9 % SODIUM CHLORIDE 0.9 %
100 INTRAVENOUS SOLUTION INTRAVENOUS
Status: COMPLETED | OUTPATIENT
Start: 2023-02-21 | End: 2023-02-21

## 2023-02-21 RX ORDER — CLINDAMYCIN PHOSPHATE 600 MG/50ML
600 INJECTION INTRAVENOUS
Status: COMPLETED | OUTPATIENT
Start: 2023-02-21 | End: 2023-02-21

## 2023-02-21 RX ORDER — HYDROCODONE BITARTRATE AND ACETAMINOPHEN 5; 325 MG/1; MG/1
1 TABLET ORAL EVERY 6 HOURS PRN
Qty: 8 TABLET | Refills: 0 | Status: SHIPPED | OUTPATIENT
Start: 2023-02-21 | End: 2023-02-23

## 2023-02-21 RX ORDER — IBUPROFEN 800 MG/1
800 TABLET ORAL EVERY 6 HOURS PRN
Qty: 20 TABLET | Refills: 0 | Status: SHIPPED | OUTPATIENT
Start: 2023-02-21

## 2023-02-21 RX ORDER — ACETAMINOPHEN 500 MG
1000 TABLET ORAL
Status: COMPLETED | OUTPATIENT
Start: 2023-02-21 | End: 2023-02-21

## 2023-02-21 RX ORDER — DEXAMETHASONE SODIUM PHOSPHATE 10 MG/ML
10 INJECTION INTRAMUSCULAR; INTRAVENOUS ONCE
Status: COMPLETED | OUTPATIENT
Start: 2023-02-21 | End: 2023-02-21

## 2023-02-21 RX ORDER — CLINDAMYCIN HYDROCHLORIDE 150 MG/1
450 CAPSULE ORAL 3 TIMES DAILY
Qty: 63 CAPSULE | Refills: 0 | Status: SHIPPED | OUTPATIENT
Start: 2023-02-21 | End: 2023-02-28

## 2023-02-21 RX ORDER — SODIUM CHLORIDE 0.9 % (FLUSH) 0.9 %
10 SYRINGE (ML) INJECTION
Status: COMPLETED | OUTPATIENT
Start: 2023-02-21 | End: 2023-02-21

## 2023-02-21 RX ADMIN — DEXAMETHASONE SODIUM PHOSPHATE 10 MG: 10 INJECTION INTRAMUSCULAR; INTRAVENOUS at 13:34

## 2023-02-21 RX ADMIN — SODIUM CHLORIDE 1000 ML: 9 INJECTION, SOLUTION INTRAVENOUS at 13:31

## 2023-02-21 RX ADMIN — SODIUM CHLORIDE 100 ML: 9 INJECTION, SOLUTION INTRAVENOUS at 13:58

## 2023-02-21 RX ADMIN — IOPAMIDOL 100 ML: 755 INJECTION, SOLUTION INTRAVENOUS at 13:58

## 2023-02-21 RX ADMIN — KETOROLAC TROMETHAMINE 15 MG: 15 INJECTION, SOLUTION INTRAMUSCULAR; INTRAVENOUS at 13:34

## 2023-02-21 RX ADMIN — CLINDAMYCIN PHOSPHATE 600 MG: 600 INJECTION, SOLUTION INTRAVENOUS at 13:38

## 2023-02-21 RX ADMIN — ACETAMINOPHEN 1000 MG: 500 TABLET, FILM COATED ORAL at 13:37

## 2023-02-21 RX ADMIN — SODIUM CHLORIDE, PRESERVATIVE FREE 10 ML: 5 INJECTION INTRAVENOUS at 13:58

## 2023-02-21 ASSESSMENT — PAIN DESCRIPTION - LOCATION: LOCATION: MOUTH

## 2023-02-21 ASSESSMENT — PAIN - FUNCTIONAL ASSESSMENT: PAIN_FUNCTIONAL_ASSESSMENT: 0-10

## 2023-02-21 ASSESSMENT — ENCOUNTER SYMPTOMS
APNEA: 0
ABDOMINAL PAIN: 0
EYE ITCHING: 0
VOMITING: 0
RHINORRHEA: 0
CHEST TIGHTNESS: 0
SORE THROAT: 0
SHORTNESS OF BREATH: 0
EYE REDNESS: 0
COUGH: 0
COLOR CHANGE: 0
FACIAL SWELLING: 1
ANAL BLEEDING: 0
EYE DISCHARGE: 0
WHEEZING: 0
BACK PAIN: 0
DIARRHEA: 0
NAUSEA: 0
ABDOMINAL DISTENTION: 0
EYE PAIN: 0

## 2023-02-21 NOTE — ED NOTES
I have reviewed discharge instructions with the patient. The patient verbalized understanding. Patient left ED via Discharge Method: ambulatory to Home with self. Opportunity for questions and clarification provided. Patient given 3 scripts. To continue your aftercare when you leave the hospital, you may receive an automated call from our care team to check in on how you are doing. This is a free service and part of our promise to provide the best care and service to meet your aftercare needs.  If you have questions, or wish to unsubscribe from this service please call [phone]. Thank you for Choosing our [address] [place] [address] Emergency Department.          Danielle Salvador RN  02/21/23 7205

## 2023-02-21 NOTE — ED PROVIDER NOTES
Emergency Department Provider Note                   PCP:                None None               Age: 28 y.o. Sex: female     No diagnosis found. DISPOSITION          Medical Decision Making  28-year-old female patient presenting with worsening jaw pain, swelling. She has fever and tachycardia on arrival.  Sepsis work-up initiated from triage. Will obtain CT imaging with contrast of the face. Patient is generally well-appearing, no evidence of Renan angina. Speaks clearly in control secretions without difficulty. Amount and/or Complexity of Data Reviewed  Labs: ordered. Radiology: ordered. Risk  OTC drugs. Prescription drug management. Orders Placed This Encounter   Procedures    Culture, Blood 1    Culture, Blood 1    CT MAXILLOFACIAL W CONTRAST    CBC with Auto Differential    Comprehensive Metabolic Panel    Lactic Acid    Procalcitonin    POC PREGNANCY UR-QUAL        Medications   0.9 % sodium chloride bolus (100 mLs IntraVENous New Bag 2/21/23 1358)   ketorolac (TORADOL) injection 15 mg (15 mg IntraVENous Given 2/21/23 1334)   clindamycin (CLEOCIN) 600 mg in dextrose 5 % 50 mL IVPB (600 mg IntraVENous New Bag 2/21/23 1338)   Decadron 10mg IM (10 mg IntraVENous Given 2/21/23 1334)   acetaminophen (TYLENOL) tablet 1,000 mg (1,000 mg Oral Given 2/21/23 1337)   0.9 % sodium chloride bolus (1,000 mLs IntraVENous New Bag 2/21/23 1331)   sodium chloride flush 0.9 % injection 10 mL (10 mLs IntraVENous Given 2/21/23 1358)   iopamidol (ISOVUE-370) 76 % injection 100 mL (100 mLs IntraVENous Given 2/21/23 1358)       New Prescriptions    No medications on file        Steven Shipley is a 28 y.o. female who presents to the Emergency Department with chief complaint of    Chief Complaint   Patient presents with    Dental Pain      28-year-old female patient presenting to this department with reports of right lower jaw pain and facial swelling.   Patient states she has had pain in the right lower jaw for quite some time. She remembers fracturing a tooth at some point, states she feels as though she may have worsened this issue after sneezing earlier this week. She woke today with facial swelling and progressed pain. She denies knowledge of fever or chills prior to arrival.  There is no discomfort in the eye though patient does report some pain in the ear that started initially several days ago. She reports no difficulty swallowing or pain in her throat. She denies changes in her voice. Patient reports no significant past medical history. She did take Motrin with some effect yesterday. The history is provided by the patient. No  was used. Review of Systems   Constitutional:  Negative for activity change, appetite change, chills, fatigue and fever. HENT:  Positive for dental problem, ear pain and facial swelling. Negative for congestion, ear discharge, rhinorrhea and sore throat. Eyes:  Negative for pain, discharge, redness, itching and visual disturbance. Respiratory:  Negative for apnea, cough, chest tightness, shortness of breath and wheezing. Cardiovascular:  Negative for chest pain, palpitations and leg swelling. Gastrointestinal:  Negative for abdominal distention, abdominal pain, anal bleeding, diarrhea, nausea and vomiting. Genitourinary:  Negative for difficulty urinating, dysuria, flank pain, frequency, hematuria, pelvic pain, vaginal bleeding and vaginal discharge. Musculoskeletal:  Negative for arthralgias, back pain, myalgias, neck pain and neck stiffness. Skin:  Negative for color change, pallor, rash and wound. Neurological:  Negative for dizziness, tremors, facial asymmetry, weakness, light-headedness, numbness and headaches. Psychiatric/Behavioral:  Negative for agitation, behavioral problems, confusion, self-injury and suicidal ideas. The patient is not nervous/anxious.     All other systems reviewed and are negative.    Past Medical History:   Diagnosis Date    Asthma         No past surgical history on file.     No family history on file.     Social History     Socioeconomic History    Marital status: Single   Tobacco Use    Smoking status: Every Day   Substance and Sexual Activity    Alcohol use: Yes    Drug use: Yes     Types: Marijuana (Weed)         Patient has no known allergies.     Previous Medications    ALBUTEROL SULFATE  (90 BASE) MCG/ACT INHALER    Inhale 2 puffs into the lungs every 4 hours as needed    BUTALBITAL-APAP-CAFFEINE -40 MG CAPS PER CAPSULE    Take 1-2 capsules by mouth every 6 hours as needed    DICLOFENAC (CATAFLAM) 50 MG TABLET    Take 1 tablet by mouth in the morning and 1 tablet at noon and 1 tablet before bedtime. Do all this for 7 days.    FLUTICASONE-SALMETEROL (ADVAIR DISKUS) 100-50 MCG/ACT AEPB DISKUS INHALER    Inhale 1 puff into the lungs every 12 hours    NAPROXEN SODIUM (ANAPROX) 550 MG TABLET    Take 550 mg by mouth 2 times daily (with meals)    ONDANSETRON (ZOFRAN-ODT) 4 MG DISINTEGRATING TABLET    Take 4 mg by mouth every 8 hours as needed        Vitals signs and nursing note reviewed.   Patient Vitals for the past 4 hrs:   Temp Pulse Resp BP SpO2   02/21/23 1223 100.2 °F (37.9 °C) (!) 125 15 (!) 142/104 97 %          Physical Exam  Vitals and nursing note reviewed.   Constitutional:       General: She is not in acute distress.     Appearance: Normal appearance. She is normal weight. She is not ill-appearing or toxic-appearing.      Comments: Generally well-appearing, alert and oriented x4.  No acute distress, speaks in clear, fluid sentences.   HENT:      Head: Normocephalic and atraumatic.      Comments: Facial swelling involving the right cheek and lower jaw.  Area is tender to touch.     Right Ear: External ear normal.      Left Ear: Tympanic membrane, ear canal and external ear normal.      Ears:      Comments: Subtle effusion on the right side, TM is not  erythematous, canal is unremarkable. Nose: Nose normal.      Mouth/Throat:      Mouth: Mucous membranes are moist.      Comments: Multiple dental caries, the right lower premolar on the right lower jaw. Surrounding erythema present, no obvious or palpable area of fluctuance. Floor the mouth is unremarkable. Posterior oropharynx is clear. Eyes:      General: No scleral icterus. Right eye: No discharge. Left eye: No discharge. Extraocular Movements: Extraocular movements intact. Cardiovascular:      Rate and Rhythm: Normal rate and regular rhythm. Pulses: Normal pulses. Heart sounds: Normal heart sounds. Pulmonary:      Effort: Pulmonary effort is normal. No tachypnea, bradypnea, accessory muscle usage, prolonged expiration or respiratory distress. Breath sounds: Normal breath sounds and air entry. No stridor. No decreased breath sounds, wheezing, rhonchi or rales. Abdominal:      General: Abdomen is flat. There is no distension. Palpations: There is no mass. Tenderness: There is no abdominal tenderness. There is no right CVA tenderness, left CVA tenderness, guarding or rebound. Negative signs include Solis's sign and McBurney's sign. Hernia: No hernia is present. Musculoskeletal:         General: No swelling, tenderness or deformity. Normal range of motion. Cervical back: Normal range of motion. Skin:     General: Skin is warm. Capillary Refill: Capillary refill takes less than 2 seconds. Neurological:      General: No focal deficit present. Mental Status: She is alert.    Psychiatric:         Mood and Affect: Mood normal.        Procedures    Results for orders placed or performed during the hospital encounter of 02/21/23   CBC with Auto Differential   Result Value Ref Range    WBC 7.8 4.3 - 11.1 K/uL    RBC 4.90 4.05 - 5.2 M/uL    Hemoglobin 14.7 11.7 - 15.4 g/dL    Hematocrit 44.1 35.8 - 46.3 %    MCV 90.0 82 - 102 FL    MCH 30.0 26.1 - 32.9 PG    MCHC 33.3 31.4 - 35.0 g/dL    RDW 13.5 11.9 - 14.6 %    Platelets 829 513 - 557 K/uL    MPV 9.8 9.4 - 12.3 FL    nRBC 0.00 0.0 - 0.2 K/uL    Differential Type AUTOMATED      Seg Neutrophils 71 43 - 78 %    Lymphocytes 17 13 - 44 %    Monocytes 11 4.0 - 12.0 %    Eosinophils % 0 (L) 0.5 - 7.8 %    Basophils 0 0.0 - 2.0 %    Immature Granulocytes 0 0.0 - 5.0 %    Segs Absolute 5.6 1.7 - 8.2 K/UL    Absolute Lymph # 1.3 0.5 - 4.6 K/UL    Absolute Mono # 0.9 0.1 - 1.3 K/UL    Absolute Eos # 0.0 0.0 - 0.8 K/UL    Basophils Absolute 0.0 0.0 - 0.2 K/UL    Absolute Immature Granulocyte 0.0 0.0 - 0.5 K/UL   Comprehensive Metabolic Panel   Result Value Ref Range    Sodium 136 133 - 143 mmol/L    Potassium 3.5 3.5 - 5.1 mmol/L    Chloride 109 101 - 110 mmol/L    CO2 22 21 - 32 mmol/L    Anion Gap 5 2 - 11 mmol/L    Glucose 125 (H) 65 - 100 mg/dL    BUN 8 6 - 23 MG/DL    Creatinine 0.80 0.6 - 1.0 MG/DL    Est, Glom Filt Rate >60 >60 ml/min/1.73m2    Calcium 9.7 8.3 - 10.4 MG/DL    Total Bilirubin 0.7 0.2 - 1.1 MG/DL    ALT 47 12 - 65 U/L    AST 22 15 - 37 U/L    Alk Phosphatase 69 50 - 136 U/L    Total Protein 8.8 (H) 6.3 - 8.2 g/dL    Albumin 4.1 3.5 - 5.0 g/dL    Globulin 4.7 (H) 2.8 - 4.5 g/dL    Albumin/Globulin Ratio 0.9 0.4 - 1.6     Lactic Acid   Result Value Ref Range    Lactic Acid, Plasma 1.3 0.4 - 2.0 MMOL/L   Procalcitonin   Result Value Ref Range    Procalcitonin <0.05 0.00 - 0.49 ng/mL        CT MAXILLOFACIAL W CONTRAST    (Results Pending)                       Voice dictation software was used during the making of this note. This software is not perfect and grammatical and other typographical errors may be present. This note has not been completely proofread for errors.      [address] Erwin Olivas [facility]  02/21/23 0636

## 2023-02-21 NOTE — DISCHARGE INSTRUCTIONS
Take the antibiotic as prescribed and to run out of medication. Use the Motrin and Norco as needed for pain. Do not take Tylenol while taking Norco.  Return in 1 to 2 days for recheck. Return sooner for worsening symptoms, concerns or questions. Use the information below to establish dental follow-up in the area. Dental Services     The Emergency Department is not able to provide dental services - tooth extractions, root canal. Though we can provide antibiotics for abccess or infection. Listed below are free or low-cost options. [facility], [address] [facility] [address]   [phone]  Tuesday and Thursday- registration starts at 10am. After registering you are given a time to return  Provides free x-rays, extractions, treatment of infection, and dental hygeine. Cannot have medicare, medicaid or other medical insurance coverage  Bring proof of [place]** residency (power bill, for example), Social Security Number and household income documents (including food stamps) as well as any current medications. (**if you do not live in [place], contact the free clinic in your home county for the availability of dental services)    [address] [facility], [address] [phone]  Monday and Wednesday 8a-5p  Tuesday and Thursday    8a-7p  Friday                               12-5p  Provides Adult and Childrens services. X-rays, extractions, treatment of infection, restorative care  Accepts medicaid, private insurance, self-pay.  Fees are on a sliding scale based on income (need to bring documentation)    [facility] [address] [facility], [address]     [phone]  Monday - Friday 8am-5p  Accepts medicaid for dental (but not dentures under 21)  Provides xrays, extractions, partials and dentures    [facility], [address] [place], [address] [phone]  Monday- Friday 9a-5p  First Come- [employer], or self pay at or near [facility].   Urgent Care only for xrays, extractions fillings and infections    [address] [facility] [place] -- Call [facility] [phone],   --- request a visit from the [facility] [place]  --- follow the prompts and leave a message  ---A nurse will call you back for a pre-screen and give you a time and location where you will be seen

## 2023-02-21 NOTE — ED TRIAGE NOTES
Pt arrives ambulatory stating dental pain. Pt has an abscess on her right side. Pt states swelling x 2 days. Pt denies fevers at home.  Pt denies n/v

## 2023-02-23 ENCOUNTER — HOSPITAL ENCOUNTER (EMERGENCY)
Age: 33
Discharge: HOME | End: 2023-02-23
Attending: EMERGENCY MEDICINE
Payer: COMMERCIAL

## 2023-02-23 VITALS
BODY MASS INDEX: 38.98 KG/M2 | RESPIRATION RATE: 18 BRPM | WEIGHT: 220 LBS | HEIGHT: 63 IN | OXYGEN SATURATION: 98 % | TEMPERATURE: 98.8 F | SYSTOLIC BLOOD PRESSURE: 119 MMHG | HEART RATE: 98 BPM | DIASTOLIC BLOOD PRESSURE: 97 MMHG

## 2023-02-23 DIAGNOSIS — L03.211 FACIAL CELLULITIS: Primary | ICD-10-CM

## 2023-02-23 PROCEDURE — 99283 EMERGENCY DEPT VISIT LOW MDM: CPT

## 2023-02-23 RX ORDER — HYDROCODONE BITARTRATE AND ACETAMINOPHEN 7.5; 325 MG/1; MG/1
1 TABLET ORAL EVERY 6 HOURS PRN
Qty: 13 TABLET | Refills: 0 | Status: SHIPPED | OUTPATIENT
Start: 2023-02-23 | End: 2023-02-28

## 2023-02-23 ASSESSMENT — PAIN SCALES - GENERAL: PAINLEVEL_OUTOF10: 8

## 2023-02-23 ASSESSMENT — ENCOUNTER SYMPTOMS
EYE ITCHING: 0
WHEEZING: 0
DIARRHEA: 0
TRISMUS: 0
EYE PAIN: 0
SINUS PAIN: 0
COUGH: 0
EYE REDNESS: 0
BACK PAIN: 0
CONSTIPATION: 0
NAUSEA: 0
ABDOMINAL PAIN: 0
SHORTNESS OF BREATH: 0
VOMITING: 0
FACIAL SWELLING: 1
TROUBLE SWALLOWING: 0

## 2023-02-23 ASSESSMENT — LIFESTYLE VARIABLES
HOW OFTEN DO YOU HAVE A DRINK CONTAINING ALCOHOL: 2-4 TIMES A MONTH
HOW MANY STANDARD DRINKS CONTAINING ALCOHOL DO YOU HAVE ON A TYPICAL DAY: 3 OR 4

## 2023-02-23 ASSESSMENT — PAIN DESCRIPTION - ORIENTATION: ORIENTATION: RIGHT

## 2023-02-23 ASSESSMENT — PAIN DESCRIPTION - LOCATION: LOCATION: FACE

## 2023-02-23 NOTE — DISCHARGE INSTRUCTIONS
You must finish all the prescribed antibiotics.  THERE SHOULD BE NO LEFT OVER PILLS!!    We would love to help you get a primary care doctor for follow-up after your emergency department visit.    Please call [phone] between 7AM - 6PM Monday to Friday.  A care navigator will be able to assist you with setting up a doctor close to your home.       Do not drink alcohol or drive while taking the prescription pain medications    Follow-up with your dentist as scheduled    Return to ER for any worsening symptoms or new problems which may arise

## 2023-02-23 NOTE — ED PROVIDER NOTES
Emergency Department Provider Note                   PCP:                None None               Age: 28 y.o. Sex: female       ICD-10-CM    1. Facial cellulitis  L03.211 HYDROcodone-acetaminophen (NORCO) 7.5-325 MG per tablet          DISPOSITION Decision To Discharge 02/23/2023 02:39:42 PM        Medical Decision Making  35-year-old female patient presents with concerns over ongoing swelling to the right side of her face  Was seen 2 days ago had a CT and lab work which revealed facial cellulitis likely from an odontogenic source  She denies fever or vomiting  Vital signs today she is afebrile. She has been compliant with her clindamycin  States her swelling still waxes and wanes  My exam does reveal swelling but no fluctuance or signs at her symptoms are worse    I advised the patient that she should continue her clindamycin as it is still has only been day 3 of her medication  I did change her narcotic prescription due to unavailability of the previous prescription    Patient has scheduled an appointment with an outpatient dentist for definitive treatment    We discussed return precautions as well. Risk  Prescription drug management. Risk Details: Elements of this note have been dictated via voice recognition software. Text and phrases may be limited by the accuracy of the software. The chart has been reviewed, but errors may still be present. Complexity of Problem: 1 stable, acute illness. (3)      I have reviewed records from an external source: ED records from outside this hospital.  I have reviewed records from an external source: provider visit notes from PCP. I have reviewed records from an external source: provider visit notes from outside specialist.  Considerations: Shared decision making was utilized in the care of this patient. Considerations: Hospitalization was considered. Patient was discharged risks and benefits of hospitalization were discussed.          No orders of the defined types were placed in this encounter. Medications - No data to display    Discharge Medication List as of 2/23/2023  2:51 PM        START taking these medications    Details   HYDROcodone-acetaminophen (NORCO) 7.5-325 MG per tablet Take 1 tablet by mouth every 6 hours as needed for Pain for up to 5 days. Intended supply: 5 days. Take lowest dose possible to manage pain Max Daily Amount: 4 tablets, Disp-13 tablet, R-0Normal              Nahed Capellan is a 28 y.o. female who presents to the Emergency Department with chief complaint of    Chief Complaint   Patient presents with    Oral Swelling      35-year-old female patient presents to the ER for recheck of right sided facial cellulitis  Patient was seen 2 days ago had lab work and a CT scan which revealed evidence of cellulitis without abscess  She received IV clindamycin and is currently on oral clindamycin at home  States her swelling has waxed and waned but really overall is improved  She has no fever or vomiting tolerating the antibiotics without difficulty she has scheduled an appointment with a dentist in about 2 weeks. The history is provided by the patient. Dental Pain  Location:  Lower  Quality:  Aching and constant  Severity:  Moderate  Onset quality:  Gradual  Duration:  5 days  Timing:  Constant  Progression:  Improving  Chronicity:  Recurrent  Context: dental caries and poor dentition    Previous work-up:  Dental exam and filled cavity  Relieved by:  NSAIDs  Worsened by:  Jaw movement, pressure and touching  Associated symptoms: facial pain, facial swelling and gum swelling    Associated symptoms: no congestion, no difficulty swallowing, no drooling, no fever, no neck swelling, no oral bleeding, no oral lesions and no trismus    Risk factors: smoking    Risk factors: no cancer        Review of Systems   Constitutional:  Negative for chills, fatigue and fever. HENT:  Positive for dental problem and facial swelling. Negative for congestion, drooling, ear pain, hearing loss, mouth sores, sinus pain, sneezing and trouble swallowing. Eyes:  Negative for pain, redness and itching. Respiratory:  Negative for cough, shortness of breath and wheezing. Cardiovascular:  Negative for chest pain and palpitations. Gastrointestinal:  Negative for abdominal pain, constipation, diarrhea, nausea and vomiting. Endocrine: Negative for polydipsia, polyphagia and polyuria. Genitourinary:  Negative for dysuria, flank pain, frequency and hematuria. Musculoskeletal:  Negative for arthralgias, back pain and myalgias. Skin:  Negative for rash and wound. Allergic/Immunologic: Negative for food allergies and immunocompromised state. Neurological:  Negative for dizziness, syncope, speech difficulty and light-headedness. Hematological:  Negative for adenopathy. Does not bruise/bleed easily. Psychiatric/Behavioral:  Negative for dysphoric mood and self-injury. The patient is not nervous/anxious. All other systems reviewed and are negative. Past Medical History:   Diagnosis Date    Asthma         No past surgical history on file. No family history on file. Social History     Socioeconomic History    Marital status: Single   Tobacco Use    Smoking status: Every Day   Substance and Sexual Activity    Alcohol use: Yes    Drug use: Yes     Types: Marijuana [place])         Patient has no known allergies. Discharge Medication List as of 2/23/2023  2:51 PM        CONTINUE these medications which have NOT CHANGED    Details   clindamycin (CLEOCIN) 150 MG capsule Take 3 capsules by mouth 3 times daily for 7 days, Disp-63 capsule, R-0Print      ibuprofen (ADVIL;MOTRIN) 800 MG tablet Take 1 tablet by mouth every 6 hours as needed for Pain, Disp-20 tablet, R-0Print      diclofenac (CATAFLAM) 50 MG tablet Take 1 tablet by mouth in the morning and 1 tablet at noon and 1 tablet before bedtime. Do all this for 7 days. , Disp-21 tablet, R-0Print      albuterol sulfate  (90 Base) MCG/ACT inhaler Inhale 2 puffs into the lungs every 4 hours as neededHistorical Med      butalbital-APAP-caffeine -40 MG CAPS per capsule Take 1-2 capsules by mouth every 6 hours as neededHistorical Med      fluticasone-salmeterol (ADVAIR DISKUS) 100-50 MCG/ACT AEPB diskus inhaler Inhale 1 puff into the lungs every 12 hoursHistorical Med      naproxen sodium (ANAPROX) 550 MG tablet Take 550 mg by mouth 2 times daily (with meals)Historical Med      ondansetron (ZOFRAN-ODT) 4 MG disintegrating tablet Take 4 mg by mouth every 8 hours as neededHistorical Med              Vitals signs and nursing note reviewed. Patient Vitals for the past 4 hrs:   Temp Pulse Resp BP SpO2   02/23/23 1437 -- 98 -- -- 98 %   02/23/23 1416 98.8 °F (37.1 °C) (!) 102 18 (!) 119/97 100 %          Physical Exam  Vitals and nursing note reviewed. Constitutional:       General: She is in acute distress. Appearance: Normal appearance. She is obese. HENT:      Head: Normocephalic and atraumatic. Comments: Mild swelling along the right mandible region  No fluctuant abscess or erythema    Poor dentition noted but no discrete dental abscess     Right Ear: External ear normal.      Left Ear: External ear normal.      Nose: Nose normal.      Mouth/Throat:      Mouth: Mucous membranes are moist.      Pharynx: Oropharynx is clear. Eyes:      General: No scleral icterus. Extraocular Movements: Extraocular movements intact. Conjunctiva/sclera: Conjunctivae normal.      Pupils: Pupils are equal, round, and reactive to light. Cardiovascular:      Rate and Rhythm: Normal rate and regular rhythm. Pulses: Normal pulses. Heart sounds: Normal heart sounds. Pulmonary:      Effort: Pulmonary effort is normal. No respiratory distress. Breath sounds: Normal breath sounds. Abdominal:      General: Abdomen is flat.  Bowel sounds are normal. There is no distension. Palpations: Abdomen is soft. There is no mass. Tenderness: There is no abdominal tenderness. Musculoskeletal:         General: No deformity or signs of injury. Normal range of motion. Cervical back: Normal range of motion and neck supple. Skin:     General: Skin is warm and dry. Capillary Refill: Capillary refill takes less than 2 seconds. Neurological:      General: No focal deficit present. Mental Status: She is alert and oriented to person, place, and time. Psychiatric:         Mood and Affect: Mood normal.         Behavior: Behavior normal.         Thought Content: Thought content normal.         Judgment: Judgment normal.        Procedures    No results found for any visits on 02/23/23. No orders to display                       Voice dictation software was used during the making of this note. This software is not perfect and grammatical and other typographical errors may be present. This note has not been completely proofread for errors.      Jazzmine Hager MD  02/23/23 1600 Private car

## 2023-02-23 NOTE — ED TRIAGE NOTES
Patient arrives with c/o right sided facial swelling. Was here 2 days prior, dx cellulitis. Sent home with ABTX and told to come back for recheck. Swelling has not improved per patient. Unable to eat/chew.

## 2023-02-26 LAB
BACTERIA SPEC CULT: NORMAL
BACTERIA SPEC CULT: NORMAL
SERVICE CMNT-IMP: NORMAL
SERVICE CMNT-IMP: NORMAL

## 2023-03-01 ENCOUNTER — HOSPITAL ENCOUNTER (EMERGENCY)
Age: 33
Discharge: HOME | End: 2023-03-01
Attending: EMERGENCY MEDICINE
Payer: COMMERCIAL

## 2023-03-01 VITALS
RESPIRATION RATE: 20 BRPM | DIASTOLIC BLOOD PRESSURE: 82 MMHG | HEART RATE: 119 BPM | WEIGHT: 200 LBS | TEMPERATURE: 98.8 F | SYSTOLIC BLOOD PRESSURE: 139 MMHG | HEIGHT: 63 IN | BODY MASS INDEX: 35.44 KG/M2 | OXYGEN SATURATION: 98 %

## 2023-03-01 DIAGNOSIS — L03.211 FACIAL CELLULITIS: ICD-10-CM

## 2023-03-01 DIAGNOSIS — K08.89 PAIN, DENTAL: Primary | ICD-10-CM

## 2023-03-01 PROCEDURE — 2700000000 HC OXYGEN THERAPY PER DAY

## 2023-03-01 PROCEDURE — 99283 EMERGENCY DEPT VISIT LOW MDM: CPT | Performed by: EMERGENCY MEDICINE

## 2023-03-01 RX ORDER — PREDNISONE 50 MG/1
50 TABLET ORAL DAILY
Qty: 5 TABLET | Refills: 0 | Status: SHIPPED | OUTPATIENT
Start: 2023-03-01 | End: 2023-03-06

## 2023-03-01 RX ORDER — AMOXICILLIN AND CLAVULANATE POTASSIUM 875; 125 MG/1; MG/1
1 TABLET, FILM COATED ORAL 2 TIMES DAILY
Qty: 20 TABLET | Refills: 0 | Status: SHIPPED | OUTPATIENT
Start: 2023-03-01 | End: 2023-03-11

## 2023-03-01 RX ORDER — KETOROLAC TROMETHAMINE 30 MG/ML
15 INJECTION, SOLUTION INTRAMUSCULAR; INTRAVENOUS ONCE
Status: DISCONTINUED | OUTPATIENT
Start: 2023-03-01 | End: 2023-03-01

## 2023-03-01 RX ORDER — METHYLPREDNISOLONE SODIUM SUCCINATE 125 MG/2ML
125 INJECTION, POWDER, LYOPHILIZED, FOR SOLUTION INTRAMUSCULAR; INTRAVENOUS DAILY
Status: DISCONTINUED | OUTPATIENT
Start: 2023-03-01 | End: 2023-03-01

## 2023-03-01 ASSESSMENT — PAIN - FUNCTIONAL ASSESSMENT: PAIN_FUNCTIONAL_ASSESSMENT: 0-10

## 2023-03-01 ASSESSMENT — PAIN SCALES - GENERAL: PAINLEVEL_OUTOF10: 7

## 2023-03-01 ASSESSMENT — PAIN DESCRIPTION - LOCATION: LOCATION: MOUTH

## 2023-03-01 ASSESSMENT — PAIN DESCRIPTION - ORIENTATION: ORIENTATION: RIGHT

## 2023-03-01 NOTE — ED NOTES
I have reviewed discharge instructions with the patient. The patient verbalized understanding. Patient left ED via Discharge Method: ambulatory to Home. Opportunity for questions and clarification provided. Patient given 2 scripts. To continue your aftercare when you leave the hospital, you may receive an automated call from our care team to check in on how you are doing. This is a free service and part of our promise to provide the best care and service to meet your aftercare needs.  If you have questions, or wish to unsubscribe from this service please call [phone]. Thank you for Choosing our [facility] Emergency Department.        Tatiana Osorio RN  03/01/23 2314

## 2023-03-01 NOTE — ED TRIAGE NOTES
Pt ambulatory to triage. Pt reports she was dx with facial cellulitis on 2/21 and given clindamycin and pain medication. Pt reports she has finished the abx and still has severe pain and swelling, pt cannot chew and has been on a liquid diet. Pt states she has a dentist appt but it is not until 3/9. Pt denies trouble breathing or swallowing, just has pain when swallowing. Pt tried to go to urgent care but they instructed her she needed to go to the ER for further evaluation.

## 2023-03-02 ASSESSMENT — ENCOUNTER SYMPTOMS
SORE THROAT: 1
NAUSEA: 0
COLOR CHANGE: 0
DIARRHEA: 0
VOMITING: 0
CHEST TIGHTNESS: 0
ABDOMINAL PAIN: 0
FACIAL SWELLING: 1
SHORTNESS OF BREATH: 0

## 2023-03-02 NOTE — ED PROVIDER NOTES
Emergency Department Provider Note                   PCP:                None None               Age: 28 y.o. Sex: female     DISPOSITION Decision To Discharge 03/01/2023 04:43:33 PM       ICD-10-CM    1. Pain, dental  K08.89       2. Facial cellulitis  L03.211           MEDICAL DECISION MAKING  Complexity of Problems Addressed:  2 stable, acute illnesses    Data Reviewed and Analyzed:  Category 1:     I ordered each unique test.  I reviewed the results of each unique test.        Category 2:         Category 3: Discussion of management or test interpretation. 68-year-old female presenting with persistent facial swelling and dental pain. Was evaluated in our department on 2/21 for this problem and prescribed oral clindamycin for facial cellulitis with possible dental abscess. Patient states that despite this, she is still having some swelling and pain. Vitally stable upon arrival.  Exam with moderate facial edema on the right with poor dentition throughout. Multiple fractured teeth but no obvious abscess seen. Did not feel any repeat imaging was necessary given patient just had a CT maxillofacial recently that identified her facial cellulitis. There was also some periapical lucency identified at that time. Suspect her pain may be more of a dental process. Will change coverage to Augmentin and also prescribe a course of prednisone for her persistent facial swelling. Advised patient to follow-up with primary care and dentistry as planned. Return precautions discussed. Patient verbalizes understanding and is agreeable to this plan.          Risk of Complications and/or Morbidity of Patient Management:  Prescription drug management completed and Patient was discharged risks and benefits of hospitalization were considered         Dustin Oakes is a 28 y.o. female who presents to the Emergency Department with chief complaint of    Chief Complaint   Patient presents with    Dental Pain    Oral Swelling Patient is a 77-year-old female with no stated medical history presenting to the emergency department for evaluation of right-sided facial swelling. States she was recently seen in our department on 2/21 and diagnosed with facial cellulitis. States she was given clindamycin which has not helped. States she does have a dentist appointment on 3/9 but is still experiencing some pain and swelling. States she has been mostly on a liquid diet as she cannot tolerate chewing. Does have some odynophagia but no dysphagia. Denies dyspnea or difficulty handling secretions. She denies fever, chills, nausea or vomiting. She has no other complaints today. The history is provided by the patient. Review of Systems   Constitutional:  Negative for chills, fatigue and fever. HENT:  Positive for dental problem, facial swelling and sore throat. Eyes:  Negative for visual disturbance. Respiratory:  Negative for chest tightness and shortness of breath. Cardiovascular:  Negative for chest pain and palpitations. Gastrointestinal:  Negative for abdominal pain, diarrhea, nausea and vomiting. Genitourinary:  Negative for dysuria. Musculoskeletal:  Negative for arthralgias and myalgias. Skin:  Negative for color change and wound. Neurological:  Negative for dizziness, syncope, weakness, light-headedness and headaches. All other systems reviewed and are negative. Vitals signs and nursing note reviewed. No data found. Physical Exam  Vitals and nursing note reviewed. Constitutional:       General: She is not in acute distress. Appearance: Normal appearance. She is not ill-appearing. HENT:      Head: Normocephalic and atraumatic. Comments: Moderate right-sided facial edema most concentrated around the maxilla/zygomatic area. No overlying erythema. No pain with EOM.      Right Ear: External ear normal.      Left Ear: External ear normal.      Nose: Nose normal. Mouth/Throat:      Mouth: Mucous membranes are moist.      Pharynx: Oropharynx is clear. No oropharyngeal exudate or posterior oropharyngeal erythema. Comments: Poor dentition throughout with multiple fractured teeth and caries. No obvious abscess identified. Eyes:      General: No scleral icterus. Right eye: No discharge. Left eye: No discharge. Extraocular Movements: Extraocular movements intact. Conjunctiva/sclera: Conjunctivae normal.      Pupils: Pupils are equal, round, and reactive to light. Cardiovascular:      Rate and Rhythm: Normal rate and regular rhythm. Pulses: Normal pulses. Heart sounds: Normal heart sounds. Pulmonary:      Effort: Pulmonary effort is normal.      Breath sounds: Normal breath sounds. Abdominal:      General: Abdomen is flat. Palpations: Abdomen is soft. Tenderness: There is no abdominal tenderness. Musculoskeletal:         General: Normal range of motion. Cervical back: Normal range of motion and neck supple. Skin:     General: Skin is warm and dry. Neurological:      General: No focal deficit present. Mental Status: She is alert and oriented to person, place, and time. Psychiatric:         Mood and Affect: Mood normal.         Behavior: Behavior normal.        Procedures          No orders of the defined types were placed in this encounter. Medications - No data to display    Discharge Medication List as of 3/1/2023  5:21 PM        START taking these medications    Details   amoxicillin-clavulanate (AUGMENTIN) 875-125 MG per tablet Take 1 tablet by mouth 2 times daily for 10 days, Disp-20 tablet, R-0Print      predniSONE (DELTASONE) 50 MG tablet Take 1 tablet by mouth daily for 5 days, Disp-5 tablet, R-0Print              Past Medical History:   Diagnosis Date    Asthma         No past surgical history on file. No family history on file.      Social History     Socioeconomic History    Marital status: Single   Tobacco Use    Smoking status: Every Day   Substance and Sexual Activity    Alcohol use: Yes    Drug use: Yes     Types: Marijuana [place])        Allergies: Patient has no known allergies. Discharge Medication List as of 3/1/2023  5:21 PM        CONTINUE these medications which have NOT CHANGED    Details   ibuprofen (ADVIL;MOTRIN) 800 MG tablet Take 1 tablet by mouth every 6 hours as needed for Pain, Disp-20 tablet, R-0Print      diclofenac (CATAFLAM) 50 MG tablet Take 1 tablet by mouth in the morning and 1 tablet at noon and 1 tablet before bedtime. Do all this for 7 days. , Disp-21 tablet, R-0Print      albuterol sulfate  (90 Base) MCG/ACT inhaler Inhale 2 puffs into the lungs every 4 hours as neededHistorical Med      butalbital-APAP-caffeine -40 MG CAPS per capsule Take 1-2 capsules by mouth every 6 hours as neededHistorical Med      fluticasone-salmeterol (ADVAIR DISKUS) 100-50 MCG/ACT AEPB diskus inhaler Inhale 1 puff into the lungs every 12 hoursHistorical Med      naproxen sodium (ANAPROX) 550 MG tablet Take 550 mg by mouth 2 times daily (with meals)Historical Med      ondansetron (ZOFRAN-ODT) 4 MG disintegrating tablet Take 4 mg by mouth every 8 hours as neededHistorical Med              No results found for any visits on 03/01/23. No orders to display                     Voice dictation software was used during the making of this note. This software is not perfect and grammatical and other typographical errors may be present. This note has not been completely proofread for errors.       Belgica Phelps, [id number]  03/02/23 9464

## 2023-12-09 ENCOUNTER — HOSPITAL ENCOUNTER (EMERGENCY)
Age: 33
Discharge: HOME | End: 2023-12-09
Payer: COMMERCIAL

## 2023-12-09 ENCOUNTER — APPOINTMENT (OUTPATIENT)
Dept: GENERAL RADIOLOGY | Age: 33
End: 2023-12-09
Payer: COMMERCIAL

## 2023-12-09 VITALS
TEMPERATURE: 98.7 F | BODY MASS INDEX: 39.62 KG/M2 | WEIGHT: 223.6 LBS | DIASTOLIC BLOOD PRESSURE: 81 MMHG | HEIGHT: 63 IN | OXYGEN SATURATION: 96 % | SYSTOLIC BLOOD PRESSURE: 123 MMHG | HEART RATE: 92 BPM | RESPIRATION RATE: 16 BRPM

## 2023-12-09 DIAGNOSIS — S39.012A STRAIN OF LUMBAR REGION, INITIAL ENCOUNTER: ICD-10-CM

## 2023-12-09 DIAGNOSIS — J45.909 ASTHMA, UNSPECIFIED ASTHMA SEVERITY, UNSPECIFIED WHETHER COMPLICATED, UNSPECIFIED WHETHER PERSISTENT: ICD-10-CM

## 2023-12-09 DIAGNOSIS — M62.830 SPASM OF MUSCLE OF LOWER BACK: Primary | ICD-10-CM

## 2023-12-09 LAB — HCG UR QL: NEGATIVE

## 2023-12-09 PROCEDURE — 81025 URINE PREGNANCY TEST: CPT

## 2023-12-09 PROCEDURE — 99284 EMERGENCY DEPT VISIT MOD MDM: CPT

## 2023-12-09 PROCEDURE — 72100 X-RAY EXAM L-S SPINE 2/3 VWS: CPT

## 2023-12-09 PROCEDURE — 6360000002 HC RX W HCPCS

## 2023-12-09 PROCEDURE — 96372 THER/PROPH/DIAG INJ SC/IM: CPT

## 2023-12-09 RX ORDER — KETOROLAC TROMETHAMINE 30 MG/ML
30 INJECTION, SOLUTION INTRAMUSCULAR; INTRAVENOUS
Status: COMPLETED | OUTPATIENT
Start: 2023-12-09 | End: 2023-12-09

## 2023-12-09 RX ORDER — ALBUTEROL SULFATE 90 UG/1
2 AEROSOL, METERED RESPIRATORY (INHALATION) EVERY 4 HOURS PRN
Qty: 18 G | Refills: 0 | Status: SHIPPED | OUTPATIENT
Start: 2023-12-09 | End: 2024-03-08

## 2023-12-09 RX ORDER — DEXAMETHASONE SODIUM PHOSPHATE 10 MG/ML
10 INJECTION INTRAMUSCULAR; INTRAVENOUS
Status: COMPLETED | OUTPATIENT
Start: 2023-12-09 | End: 2023-12-09

## 2023-12-09 RX ORDER — MELOXICAM 7.5 MG/1
7.5 TABLET ORAL 2 TIMES DAILY WITH MEALS
Qty: 14 TABLET | Refills: 0 | Status: SHIPPED | OUTPATIENT
Start: 2023-12-09 | End: 2023-12-16

## 2023-12-09 RX ORDER — KETOROLAC TROMETHAMINE 30 MG/ML
30 INJECTION, SOLUTION INTRAMUSCULAR; INTRAVENOUS
Status: DISCONTINUED | OUTPATIENT
Start: 2023-12-09 | End: 2023-12-09

## 2023-12-09 RX ORDER — METHOCARBAMOL 750 MG/1
750 TABLET, FILM COATED ORAL 4 TIMES DAILY
Qty: 40 TABLET | Refills: 0 | Status: SHIPPED | OUTPATIENT
Start: 2023-12-09 | End: 2023-12-19

## 2023-12-09 RX ORDER — PREDNISONE 20 MG/1
20 TABLET ORAL
Qty: 5 TABLET | Refills: 0 | Status: SHIPPED | OUTPATIENT
Start: 2023-12-09 | End: 2023-12-14

## 2023-12-09 RX ADMIN — DEXAMETHASONE SODIUM PHOSPHATE 10 MG: 10 INJECTION INTRAMUSCULAR; INTRAVENOUS at 16:01

## 2023-12-09 RX ADMIN — KETOROLAC TROMETHAMINE 30 MG: 30 INJECTION, SOLUTION INTRAMUSCULAR at 16:05

## 2023-12-09 ASSESSMENT — PAIN - FUNCTIONAL ASSESSMENT: PAIN_FUNCTIONAL_ASSESSMENT: 0-10

## 2023-12-09 ASSESSMENT — PAIN SCALES - GENERAL
PAINLEVEL_OUTOF10: 8
PAINLEVEL_OUTOF10: 6
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
## 2025-06-23 ENCOUNTER — APPOINTMENT (OUTPATIENT)
Dept: CT IMAGING | Age: 35
End: 2025-06-23

## 2025-06-23 ENCOUNTER — HOSPITAL ENCOUNTER (EMERGENCY)
Age: 35
Discharge: HOME | End: 2025-06-23

## 2025-06-23 VITALS
HEART RATE: 83 BPM | OXYGEN SATURATION: 98 % | HEIGHT: 63 IN | RESPIRATION RATE: 16 BRPM | WEIGHT: 220 LBS | BODY MASS INDEX: 38.98 KG/M2 | TEMPERATURE: 98 F | DIASTOLIC BLOOD PRESSURE: 91 MMHG | SYSTOLIC BLOOD PRESSURE: 149 MMHG

## 2025-06-23 DIAGNOSIS — S02.2XXA CLOSED FRACTURE OF NASAL BONE, INITIAL ENCOUNTER: ICD-10-CM

## 2025-06-23 DIAGNOSIS — S02.85XB OPEN FRACTURE OF ORBIT, INITIAL ENCOUNTER (HCC): Primary | ICD-10-CM

## 2025-06-23 LAB — HCG UR QL: NEGATIVE

## 2025-06-23 PROCEDURE — 99284 EMERGENCY DEPT VISIT MOD MDM: CPT

## 2025-06-23 PROCEDURE — 81025 URINE PREGNANCY TEST: CPT

## 2025-06-23 PROCEDURE — 70486 CT MAXILLOFACIAL W/O DYE: CPT

## 2025-06-23 PROCEDURE — 6360000002 HC RX W HCPCS

## 2025-06-23 PROCEDURE — 6370000000 HC RX 637 (ALT 250 FOR IP)

## 2025-06-23 PROCEDURE — 2580000003 HC RX 258

## 2025-06-23 PROCEDURE — 96365 THER/PROPH/DIAG IV INF INIT: CPT

## 2025-06-23 RX ORDER — AMPICILLIN AND SULBACTAM 1; .5 G/1; G/1
1500 INJECTION, POWDER, FOR SOLUTION INTRAMUSCULAR; INTRAVENOUS ONCE
Status: DISCONTINUED | OUTPATIENT
Start: 2025-06-23 | End: 2025-06-23 | Stop reason: SDUPTHER

## 2025-06-23 RX ORDER — TETRACAINE HYDROCHLORIDE 5 MG/ML
1 SOLUTION OPHTHALMIC
Status: COMPLETED | OUTPATIENT
Start: 2025-06-23 | End: 2025-06-23

## 2025-06-23 RX ORDER — HYDROCODONE BITARTRATE AND ACETAMINOPHEN 5; 325 MG/1; MG/1
1 TABLET ORAL
Refills: 0 | Status: COMPLETED | OUTPATIENT
Start: 2025-06-23 | End: 2025-06-23

## 2025-06-23 RX ORDER — HYDROCODONE BITARTRATE AND ACETAMINOPHEN 5; 325 MG/1; MG/1
1 TABLET ORAL EVERY 4 HOURS PRN
Qty: 18 TABLET | Refills: 0 | Status: SHIPPED | OUTPATIENT
Start: 2025-06-23 | End: 2025-06-26

## 2025-06-23 RX ADMIN — TETRACAINE HYDROCHLORIDE 1 DROP: 5 SOLUTION OPHTHALMIC at 22:47

## 2025-06-23 RX ADMIN — AMPICILLIN AND SULBACTAM 1500 MG: 1; .5 INJECTION, POWDER, FOR SOLUTION INTRAMUSCULAR; INTRAVENOUS at 22:16

## 2025-06-23 RX ADMIN — FLUORESCEIN SODIUM 1 MG: 1 STRIP OPHTHALMIC at 22:47

## 2025-06-23 RX ADMIN — HYDROCODONE BITARTRATE AND ACETAMINOPHEN 1 TABLET: 5; 325 TABLET ORAL at 19:48

## 2025-06-23 ASSESSMENT — PAIN DESCRIPTION - PAIN TYPE: TYPE: ACUTE PAIN

## 2025-06-23 ASSESSMENT — VISUAL ACUITY
OS: 20/50
OD: 20/70
OU: 20/50

## 2025-06-23 ASSESSMENT — LIFESTYLE VARIABLES
HOW MANY STANDARD DRINKS CONTAINING ALCOHOL DO YOU HAVE ON A TYPICAL DAY: 1 OR 2
HOW OFTEN DO YOU HAVE A DRINK CONTAINING ALCOHOL: MONTHLY OR LESS

## 2025-06-23 ASSESSMENT — PAIN DESCRIPTION - ORIENTATION: ORIENTATION: RIGHT

## 2025-06-23 ASSESSMENT — PAIN DESCRIPTION - DESCRIPTORS: DESCRIPTORS: ACHING

## 2025-06-23 ASSESSMENT — PAIN SCALES - GENERAL
PAINLEVEL_OUTOF10: 10
PAINLEVEL_OUTOF10: 8

## 2025-06-23 ASSESSMENT — TONOMETRY
IOP_HANDHELD: 1
OD_IOP_MMHG: 36

## 2025-06-23 ASSESSMENT — PAIN DESCRIPTION - LOCATION: LOCATION: FACE

## 2025-06-23 ASSESSMENT — PAIN - FUNCTIONAL ASSESSMENT: PAIN_FUNCTIONAL_ASSESSMENT: 0-10
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
## 2025-06-24 NOTE — DISCHARGE INSTRUCTIONS
Your CT scan today showed a broken bone on the lower side of your eye, you also have some broken nasal bones.  You will need to follow-up with a plastic surgeon regarding this.  They should call you to schedule appointment, however their information is also listed on your paperwork for you to call them.  Please do not blow your nose to prevent jostling the broken bones.  Please take the antibiotics as prescribed.  You may use Tylenol and Advil for pain control.  I will also give you a prescription for Norco to save for severe pain.    It was a pleasure taking care of you today.  We sincerely hope you feel better soon.